# Patient Record
Sex: MALE | Race: WHITE | Employment: OTHER | URBAN - METROPOLITAN AREA
[De-identification: names, ages, dates, MRNs, and addresses within clinical notes are randomized per-mention and may not be internally consistent; named-entity substitution may affect disease eponyms.]

---

## 2017-08-22 PROBLEM — J30.9 ALLERGIC RHINITIS: Status: ACTIVE | Noted: 2017-08-22

## 2017-11-29 PROBLEM — J32.9 RECURRENT SINUSITIS: Status: ACTIVE | Noted: 2017-11-29

## 2018-06-19 PROBLEM — E66.3 OVERWEIGHT (BMI 25.0-29.9): Status: ACTIVE | Noted: 2018-06-19

## 2018-12-06 PROBLEM — J32.9 RECURRENT SINUSITIS: Status: RESOLVED | Noted: 2017-11-29 | Resolved: 2018-12-06

## 2020-12-22 ENCOUNTER — HOSPITAL ENCOUNTER (OUTPATIENT)
Dept: GENERAL RADIOLOGY | Age: 75
Discharge: HOME | End: 2020-12-22
Attending: FAMILY MEDICINE
Payer: MEDICARE

## 2020-12-22 DIAGNOSIS — R05.9 COUGH: ICD-10-CM

## 2020-12-22 PROCEDURE — 71046 X-RAY EXAM CHEST 2 VIEWS: CPT

## 2021-01-22 ENCOUNTER — HOSPITAL ENCOUNTER (OUTPATIENT)
Dept: GENERAL RADIOLOGY | Age: 76
Discharge: HOME | End: 2021-01-22
Payer: MEDICARE

## 2021-01-22 DIAGNOSIS — J18.9 PNEUMONIA OF RIGHT LOWER LOBE DUE TO INFECTIOUS ORGANISM: ICD-10-CM

## 2021-01-22 PROCEDURE — 71046 X-RAY EXAM CHEST 2 VIEWS: CPT

## 2021-01-24 NOTE — PROGRESS NOTES
Please let know improved. Still with some scarring and atelectasis. Would like to go ahead and proceed with CT chest w/o contrast to further evaluate.

## 2021-02-01 ENCOUNTER — HOSPITAL ENCOUNTER (OUTPATIENT)
Dept: CT IMAGING | Age: 76
Discharge: HOME | End: 2021-02-01
Attending: FAMILY MEDICINE
Payer: MEDICARE

## 2021-02-01 DIAGNOSIS — J98.4 SCARRING OF LUNG: ICD-10-CM

## 2021-02-01 DIAGNOSIS — J18.9 PNEUMONIA OF RIGHT LOWER LOBE DUE TO INFECTIOUS ORGANISM: ICD-10-CM

## 2021-02-01 PROCEDURE — 71250 CT THORAX DX C-: CPT

## 2021-05-06 ENCOUNTER — HOSPITAL ENCOUNTER (OUTPATIENT)
Dept: CT IMAGING | Age: 76
Discharge: HOME | End: 2021-05-06
Attending: PHYSICIAN ASSISTANT
Payer: MEDICARE

## 2021-05-06 DIAGNOSIS — U07.1 COVID-19: ICD-10-CM

## 2021-05-06 DIAGNOSIS — J12.82 PNEUMONIA DUE TO COVID-19 VIRUS: ICD-10-CM

## 2021-05-06 DIAGNOSIS — U07.1 PNEUMONIA DUE TO COVID-19 VIRUS: ICD-10-CM

## 2021-05-06 PROCEDURE — 71250 CT THORAX DX C-: CPT

## 2021-06-25 PROBLEM — Z86.16 HISTORY OF COVID-19: Status: ACTIVE | Noted: 2021-06-25

## 2021-06-25 PROBLEM — N40.0 BENIGN PROSTATIC HYPERPLASIA WITHOUT LOWER URINARY TRACT SYMPTOMS: Status: ACTIVE | Noted: 2021-06-25

## 2021-08-10 ENCOUNTER — HOSPITAL ENCOUNTER (OUTPATIENT)
Dept: WOUND CARE | Age: 76
Discharge: HOME | End: 2021-08-10
Attending: SURGERY
Payer: MEDICARE

## 2021-08-10 VITALS
TEMPERATURE: 96.3 F | RESPIRATION RATE: 20 BRPM | WEIGHT: 215 LBS | SYSTOLIC BLOOD PRESSURE: 137 MMHG | HEIGHT: 72 IN | HEART RATE: 84 BPM | OXYGEN SATURATION: 98 % | DIASTOLIC BLOOD PRESSURE: 84 MMHG | BODY MASS INDEX: 29.12 KG/M2

## 2021-08-10 DIAGNOSIS — S81.802A LEG WOUND, LEFT, INITIAL ENCOUNTER: Primary | ICD-10-CM

## 2021-08-10 PROCEDURE — 99204 OFFICE O/P NEW MOD 45 MIN: CPT | Performed by: SURGERY

## 2021-08-10 PROCEDURE — 99213 OFFICE O/P EST LOW 20 MIN: CPT

## 2021-08-10 RX ORDER — LIDOCAINE 40 MG/G
CREAM TOPICAL ONCE
Status: CANCELLED | OUTPATIENT
Start: 2021-08-10 | End: 2021-08-10

## 2021-08-10 RX ORDER — LIDOCAINE HYDROCHLORIDE 40 MG/ML
SOLUTION TOPICAL ONCE
Status: CANCELLED | OUTPATIENT
Start: 2021-08-10 | End: 2021-08-10

## 2021-08-10 RX ORDER — BETAMETHASONE DIPROPIONATE 0.5 MG/G
OINTMENT TOPICAL ONCE
Status: CANCELLED | OUTPATIENT
Start: 2021-08-10 | End: 2021-08-10

## 2021-08-10 RX ORDER — BACITRACIN 500 [USP'U]/G
OINTMENT TOPICAL ONCE
Status: CANCELLED | OUTPATIENT
Start: 2021-08-10 | End: 2021-08-10

## 2021-08-10 RX ORDER — GENTAMICIN SULFATE 1 MG/G
OINTMENT TOPICAL ONCE
Status: CANCELLED | OUTPATIENT
Start: 2021-08-10 | End: 2021-08-10

## 2021-08-10 RX ORDER — LIDOCAINE HYDROCHLORIDE 20 MG/ML
JELLY TOPICAL ONCE
Status: CANCELLED | OUTPATIENT
Start: 2021-08-10 | End: 2021-08-10

## 2021-08-10 RX ORDER — SILVER SULFADIAZINE 10 G/1000G
CREAM TOPICAL ONCE
Status: CANCELLED | OUTPATIENT
Start: 2021-08-10 | End: 2021-08-10

## 2021-08-10 RX ORDER — BACITRACIN ZINC AND POLYMYXIN B SULFATE 500; 1000 [USP'U]/G; [USP'U]/G
OINTMENT TOPICAL ONCE
Status: CANCELLED | OUTPATIENT
Start: 2021-08-10 | End: 2021-08-10

## 2021-08-10 RX ORDER — MUPIROCIN 20 MG/G
OINTMENT TOPICAL ONCE
Status: CANCELLED | OUTPATIENT
Start: 2021-08-10 | End: 2021-08-10

## 2021-08-10 RX ORDER — LIDOCAINE 50 MG/G
OINTMENT TOPICAL ONCE
Status: CANCELLED | OUTPATIENT
Start: 2021-08-10 | End: 2021-08-10

## 2021-08-10 RX ORDER — CLOBETASOL PROPIONATE 0.5 MG/G
OINTMENT TOPICAL ONCE
Status: CANCELLED | OUTPATIENT
Start: 2021-08-10 | End: 2021-08-10

## 2021-08-10 RX ORDER — TRIAMCINOLONE ACETONIDE 1 MG/G
OINTMENT TOPICAL ONCE
Status: CANCELLED | OUTPATIENT
Start: 2021-08-10 | End: 2021-08-10

## 2021-08-10 NOTE — WOUND CARE
[address]  [address], [address]  Phone: [phone]  Fax: [phone]    Patient: Elian Boyd MRN: [id number]  SSN: [id number]    YOB: 1945  Age: 70 y.o. Sex: male       Return Appointment: 2 weeks with Dr Prosper Dykes    Instructions: Left Lower Leg:  Cleanse with Normal Saline  Hydrofera Ready: Cut to wound size, place in wound bed, shiny side out. Cover with Gauze/ABD  Secure with Coversite/Rolled Gauze  Change 3x/week  Tubigrip to Left Lower Leg    DO NOT GET WOUND WET-PURCHASE CAST COVER AT [facility] OR [facility]    Should you experience increased redness, swelling, pain, foul odor, size of wound(s), or have a temperature over 101 degrees please contact the [address] [place] [address] Road at [phone] or if after hours contact your primary care physician or go to the hospital emergency department.     Signed By: Sonal Garzon RN     August 10, 2021

## 2021-08-10 NOTE — DISCHARGE INSTRUCTIONS
[address]  [address], [address]  Phone: [phone]  Fax: [phone]    Patient: Leatha Lanes MRN: [id number]  SSN: [id number]    YOB: 1945  Age: 70 y.o. Sex: male       Return Appointment: 2 weeks with Dr Lamar Gonzalez    Instructions: Left Lower Leg:  Cleanse with Normal Saline  Hydrofera Ready: Cut to wound size, place in wound bed, shiny side out. Cover with Gauze/ABD  Secure with Coversite/Rolled Gauze  Change 3x/week  Tubigrip to Left Lower Leg    DO NOT GET WOUND WET-PURCHASE CAST COVER AT [facility] OR [facility]    Should you experience increased redness, swelling, pain, foul odor, size of wound(s), or have a temperature over 101 degrees please contact the [address] [place] [address] Road at [phone] or if after hours contact your primary care physician or go to the hospital emergency department.     Signed By: Hansel Hammonds RN     August 10, 2021

## 2021-08-10 NOTE — WOUND CARE
08/10/21 1337   Wound Leg lower Left 08/10/21   Date First Assessed: 08/10/21   Wound Approximate Age at First Assessment (Weeks): 5 weeks  Primary Wound Type: Traumatic  Location: Leg lower  Wound Location Orientation: Left  Date of First Observation: 08/10/21   Wound Image    Wound Etiology Traumatic   Cleansed Cleansed with saline   Dressing/Treatment Open to air   Wound Length (cm) 3.5 cm   Wound Width (cm) 0.8 cm   Wound Depth (cm) 0.1 cm   Wound Surface Area (cm^2) 2.8 cm^2   Wound Volume (cm^3) 0.28 cm^3   Wound Assessment Granulation tissue;Slough   Drainage Amount Moderate   Drainage Description Serosanguinous   Wound Odor None   Melba-Wound/Incision Assessment Intact   Wound Thickness Description Full thickness

## 2021-08-10 NOTE — PROGRESS NOTES
[address]   Progress Note and Procedure Note   NO Procedure      Ld Wick III  MEDICAL RECORD NUMBER:  [id number]  AGE: 70 y.o. RACE WHITE/NON-  GENDER: male  : 1945  EPISODE DATE:  8/10/2021    Subjective:     Chief Complaint   Patient presents with    Wound Check     LLE         HISTORY of PRESENT ILLNESS HPI    Ld Wick III is a 70 y.o. male  with a history of COPD, SHYAM,prior tobacco abuse, prior R lobectomy,  chronic sinusitis/polyps, allergic rhinitis, HTN, and GERD. Pt was diagnosed with COVID 2020. Pt was treated with abx and steroids for COVID PNA on 2020. Patient has been referred to wound clinic d/t non healing wound on LLE. He obtained a laceration to his leg doing yard work about 4 weeks prior. He has been using bactroban on the wound. He has a history of bilateral knee replacements with the left knee having to be replace due to infection and he is worried this may cause an infection to his hardware. He has been seen by his PCP and has had two cultures, the last returning with pan sensitive Citrobacter Koseri. He is currently taking doxyclcline and this seems to have made a big difference in healing per patient. He does not have history of PVD or Venous insufficiency. He is retired. He worked as an . He denies fever, chills, or sweats. History of Wound Context: LLE traumatic wound 4 weeks prior. Wound/Ulcer Pain Timing/Severity: mild  Quality of pain: sharp  Severity:  5 / 10   Modifying Factors: Pain worsens with walking  Associated Signs/Symptoms: edema, erythema, decreased drainage and pain    Ulcer Identification:  Ulcer Type: traumatic    Contributing Factors: none    Wound: LLE traumatic wound, scaling around wound, minor erythema and edema.           PAST MEDICAL HISTORY    Past Medical History:   Diagnosis Date    Asthma     Bronchitis     COPD [facility]chronic obstructive pulmonary disease) ([facility] [address]     Depression     Elevated PSA 2016    GERD (gastroesophageal reflux disease)     Hypertension     Malignant melanoma of skin (HCC)     SHYAM (obstructive sleep apnea)     Personal history of colonic polyps     Prostatitis     Sinusitis, chronic         PAST SURGICAL HISTORY    Past Surgical History:   Procedure Laterality Date    HX CATARACT REMOVAL Bilateral ,2010    HX HEENT      sinus surgery    HX KNEE REPLACEMENT      right 2006 left 2013    HX KNEE REPLACEMENT Left     HX LOBECTOMY      right lung    HX MALIGNANT SKIN LESION EXCISION      rt shoulder, forehead     HX RHINOPLASTY  2009    HX RHINOPLASTY  2018       FAMILY HISTORY    Family History   Problem Relation Age of Onset    Colon Cancer Father     Prostate Cancer Father     Hypertension Mother        SOCIAL HISTORY    Social History     Tobacco Use    Smoking status: Former Smoker     Packs/day: 1.00     Years: 7.00     Pack years: 7.00     Types: Cigarettes     Quit date: 3/24/1975     Years since quittin.4    Smokeless tobacco: Never Used   Vaping Use    Vaping Use: Never used   Substance Use Topics    Alcohol use: Yes     Comment: rarely    Drug use: No       ALLERGIES    Allergies   Allergen Reactions    Avelox [Moxifloxacin] Hives    Celebrex [Celecoxib] Hives    Penicillins Unknown (comments)     Upset stomach       MEDICATIONS    Current Outpatient Medications on File Prior to Encounter   Medication Sig Dispense Refill    doxycycline (MONODOX) 100 mg capsule Take 1 Capsule by mouth two (2) times a day for 14 days. 28 Capsule 0    benralizumab (Fasenra) 30 mg/mL syrg 30 mg by SubCUTAneous route every two (2) months.  tamsulosin (FLOMAX) 0.4 mg capsule Take 1 Cap by mouth daily. 90 Cap 3    irbesartan (AVAPRO) 300 mg tablet Take 1 Tab by mouth nightly. 90 Tab 3    amLODIPine (NORVASC) 10 mg tablet Take 1 Tab by mouth daily.  90 Tab 3    budesonide-formoterol (SYMBICORT) 160-4.5 mcg/actuation HFAA Take 2 Puffs by inhalation two (2) times a day.  fluticasone (FLONASE) 50 mcg/actuation nasal spray 2 puffs each nostril at bedtime 3 Bottle 3    montelukast (SINGULAIR) 10 mg tablet Take 1 Tab by mouth daily. 90 Tab 3    multivitamin (ONE A DAY) tablet Take 1 Tab by mouth daily.  albuterol (PROAIR HFA) 90 mcg/actuation inhaler Take  by inhalation.  cholecalciferol, vitamin D3, (VITAMIN D3) 2,000 unit tab Take  by mouth. No current facility-administered medications on file prior to encounter. REVIEW OF SYSTEMS    A comprehensive review of systems was negative except for that written in the HPI. Objective:     Visit Vitals  /84 (BP 1 Location: Left upper arm, BP Patient Position: At rest)   Pulse 84   Temp (!) 96.3 °F (35.7 °C)   Resp 20   Ht 6' (1.829 m)   Wt 215 lb (97.5 kg)   SpO2 98%   BMI 29.16 kg/m²       Wt Readings from Last 3 Encounters:   08/10/21 215 lb (97.5 kg)   08/03/21 212 lb (96.2 kg)   07/21/21 215 lb (97.5 kg)       PHYSICAL EXAM    General: well developed, well nourished, pleasant, NAD. Hygiene good  Psych: cooperative. Pleasant. No anxiety or depression. Normal mood and affect. HEENT: Normocephalic, atraumatic. EOMI. Conjunctiva clear. No scleral icterus. Neck: Normal range of motion. No masses. Chest: Good air entry bilaterally. Respirations nonlabored  Cardio: Normal heart sounds,no rubs, murmurs or gallops  Abdomen: Soft, nontender, nondistended, normoactive bowel sounds  Vascular: Femoral pulse present , DP and PT pulses are present at 2/4 bilateral. Skin temperature is uniform from proximal to distal bilateral. Hair growth is present bilateral. No erythema, edema, Tempeture warm , No evidence of cellulitis. Derm:  Skin is atrophic and xerotic. Mild scaling around LLE wound  Neuro: Epicritic sensation is intact bilateral.   Msk: Muscle strength is 5/5 for plantar and dorsiflexion of foot bilateral. ROM of all joints is full and fluid without pain. No effusions are palpable. Assessment:      Problem List Items Addressed This Visit        Other    Leg wound, left, initial encounter - Primary    Relevant Orders    INITIATE OUTPATIENT WOUND CARE PROTOCOL          Procedure Note  Indications:  Based on my examination of this patient's wound(s)/ulcer(s) today, debridement is not required to promote healing and evaluate the wound base. Wound Leg lower Left 08/10/21 (Active)   Wound Image   08/10/21 1337   Wound Etiology Traumatic 08/10/21 1337   Cleansed Cleansed with saline 08/10/21 1337   Dressing/Treatment Open to air 08/10/21 1337   Wound Length (cm) 3.5 cm 08/10/21 1337   Wound Width (cm) 0.8 cm 08/10/21 1337   Wound Depth (cm) 0.1 cm 08/10/21 1337   Wound Surface Area (cm^2) 2.8 cm^2 08/10/21 1337   Wound Volume (cm^3) 0.28 cm^3 08/10/21 1337   Wound Assessment Granulation tissue;Slough 08/10/21 1337   Drainage Amount Moderate 08/10/21 1337   Drainage Description Serosanguinous 08/10/21 1337   Wound Odor None 08/10/21 1337   Melba-Wound/Incision Assessment Intact 08/10/21 1337   Wound Thickness Description Full thickness 08/10/21 1337   Number of days: 0        Amount and/or Complexity of Data Reviewed and Analyzed:  I reviewed and analyzed all of the unique labs and radiologic studies that are shown below as well as any that are in the HPI, and any that are in the expanded problem list below  *Each unique test, order, or document contributes to the combination of 2 or combination of 3 in Category 1 below. I also independently reviewed radiology images for studies I described above in the HPI or studies I have ordered. For this visit I also reviewed old records and prior notes. No results for input(s): WBC, HGB, PLT, NA, K, CL, CO2, BUN, CREA, GLU, PTP, INR, APTT, TBIL, TBILI, CBIL, ALT, AP, AML, AML, LPSE, LCAD, NH4, TROPT, TROIQ, PCO2, PO2, HCO3, HGBEXT, PLTEXT, INREXT, HGBEXT, PLTEXT, INREXT in the last 72 hours.     No lab exists for component: SGOT, GPT,  PH  No results for input(s): PTP, INR, APTT, TBIL, TBILI, CBIL, ALT, AP, AML, LPSE, INREXT, INREXT in the last 72 hours. No lab exists for component: SGOT, GPT    Most recent blood counts (CBC) review  Lab Results   Component Value Date/Time    WBC 6.3 12/23/2020 11:35 AM    HGB 15.8 12/23/2020 11:35 AM    HCT 46.9 12/23/2020 11:35 AM    PLATELET 093 90/95/0253 11:35 AM    MCV 91 12/23/2020 11:35 AM     Most recent chemistry (BMP) test review   Lab Results   Component Value Date/Time    Sodium 144 12/23/2020 11:35 AM    Potassium 4.4 12/23/2020 11:35 AM    Chloride 103 12/23/2020 11:35 AM    CO2 26 12/23/2020 11:35 AM    Glucose 96 12/23/2020 11:35 AM    BUN 14 12/23/2020 11:35 AM    Creatinine 0.86 12/23/2020 11:35 AM    BUN/Creatinine ratio 16 12/23/2020 11:35 AM    GFR est AA 99 12/23/2020 11:35 AM    GFR est non-AA 86 12/23/2020 11:35 AM    Calcium 9.2 12/23/2020 11:35 AM     Most recent Liver enzyme test review  Lab Results   Component Value Date/Time    ALT (SGPT) 17 12/23/2020 11:35 AM    AST (SGOT) 14 12/23/2020 11:35 AM    Alk.  phosphatase 110 12/23/2020 11:35 AM    Bilirubin, total 0.8 12/23/2020 11:35 AM     Most recent coagulation (coags) review  No results found for: INR, PTMR, PTP, PT1, PT2, INREXT, INREXT  No results found for: INR, APTT, CBIL, AML, AML, LPSE, LCAD, NH4, TROPT, TROIQ, INREXT, INREXT    Diabetic assessment  No results found for: HBA1C, PVA2WTHU, CNM5GAFS, KHZ3DIEH    Nutritional assessment screen to assess wound healing ability:  Lab Results   Component Value Date/Time    Protein, total 6.7 12/23/2020 11:35 AM    Albumin 4.3 12/23/2020 11:35 AM     Lab Results   Component Value Date/Time    Protein, total 6.7 12/23/2020 11:35 AM    Albumin 4.3 12/23/2020 11:35 AM       XR Results (most recent):  Results from Hospital Encounter encounter on 01/22/21    XR CHEST PA LAT    Narrative  TWO-VIEW CHEST:    CLINICAL HISTORY: Follow-up right lower lobe pneumonia. COMPARISON:  December 20, 2020. FINDINGS: PA and lateral chest images demonstrate interval improvement in right  right basilar atelectasis/infiltrate with mild residual bibasilar  atelectasis/scarring. Elevation of the right hemidiaphragm is again noted in  association with interposition of the hepatic flexure between the liver and  hemidiaphragm. The heart size is within normal limits without evidence of  congestive heart failure or pneumothorax. The bony thorax appears intact on  these views. Impression  INTERVAL IMPROVEMENT IN RIGHT BASILAR ATELECTASIS/INFILTRATE WITH  MILD RESIDUAL BIBASILAR ATELECTASIS/SCAR ADJACENT TO THE MILDLY ELEVATED  HEMIDIAPHRAGM. CT Results (most recent):  Results from Hospital Encounter encounter on 05/06/21    CT CHEST WO CONT    Narrative  EXAMINATION: CT CHEST WITHOUT INTRAVENOUS CONTRAST 5/6/2021 9:15 AM    ACCESSION NUMBER: [id number]    INDICATION: Pneumonia due to COVID 19    COMPARISON: Chest CT 2/1/2021    TECHNIQUE: Multiple contiguous axial CT images of the chest were obtained from  the lung apices to the lung bases after without intravenous contrast.    Radiation dose reduction techniques were used for this study:  Our CT scanners  use one or all of the following: Automated exposure control, adjustment of the  mA and/or kVp according to patient's size, iterative reconstruction. FINDINGS:    THORACIC AORTA: Mild scattered atherosclerosis. PROXIMAL GREAT VESSELS: Unremarkable    HEART: Left anterior descending coronary artery atherosclerosis. PERICARDIUM: Unremarkable    MEDIASTINAL LYMPH NODES: Unremarkable  HILAR LYMPH NODES: Unremarkable  AXILLARY LYMPH NODES: Unremarkable    PULMONARY PARENCHYMA: Minimal linear scarring in the lingula, unchanged from the  prior. 0.2 cm left lower lobe subpleural nodule (axial image 27). Voice calcifications along the base of the right middle lobe unchanged.  Linear  opacities and reticulonodular densities in the periphery of the right upper lobe  and right middle lobe persists but are somewhat improved in comparison to the  prior. No new suspicious nodules or masses in the right lung. The large central airways are predominantly clear. PLEURA: No pneumothorax. No pleural effusion. VISUALIZED UPPER ABDOMEN: There is no free intraperitoneal gas in the included  portions of the upper abdomen. Cholelithiasis. OSSEOUS STRUCTURES: Thoracic spine spondylosis without suspicious lytic or  blastic bony lesions. Impression  1. Some improvement of ground glass opacities and reticular nodular densities in  the periphery of the right upper lobe and right middle lobe. The remaining areas  of abnormal pulmonary parenchyma may represent subacute to chronic sequelae of  the prior COVID 19 infection. To what extent these may improve in the future is  uncertain. 2. Unchanged linear scarring in the lingula. 3. New 0.2 cm left lower lobe subpleural nodule, favored to be inflammatory. VOICE DICTATED BY: Dr. Aung Carbajal        Admission date (for inpatients): 8/10/2021   * No surgery found *  * No surgery found *    Plan:     70 y.o. male  with a history of COPD, SHYAM,prior tobacco abuse, prior R lobectomy,  chronic sinusitis/polyps, allergic rhinitis, HTN, and GERD.      Traumatic Laceration of left lower extremity, initial encounter  Patient with culture of wound with Dontrell Norman; antibiotic coverage appropriate  Patient will finish course of antibiotics  No need for further cultures  Will apply hydrofera ready to wound bed, instructed patient with wound care  Will apply minimal compression with tubigrip  Patient to return to clinic in 2 weeks for assessment  Patient with great pulses in feet, no swelling in LLE or RLE    Follow-up Information     Follow up With Specialties Details Why Contact Info    [facility] Wound Care In 2 weeks For wound re-check Lionel Altamirano [facility] [place] [address] [address] [place] [address]  [phone]                Active Orders   Nursing    INITIATE OUTPATIENT WOUND CARE PROTOCOL     Frequency: ONE TIME     Number of Occurrences: 1 Occurrences     Order Comments: Cleanse wound with saline. If wound contains bioburden or contamination, cleanse with wound cleanser or antimicrobial solution. For normal periwound tissue without irritation nor maceration, apply topical skin protectant. For periwound tissue with irritation and/or maceration, apply zinc based product, topical steroid cream/ointment, or equivalent. For wounds with dry firm black eschar and/or without exudate, apply betadine and leave open to air. For wounds with scant/small to no exudate or drainage, apply wound gel, hydrocolloid, polymer, or equivalent and cover with secondary dressing/foam.    For wounds with moderate/large exudate or drainage, apply alginate, hydrofiber, polymer, or equivalent and cover with secondary dressing/foam.    For wounds with nonviable tissue requiring removal, apply chemical or mechanical debrider and cover with secondary dressing/foam.    For wounds with tunneling, dead space, or cavity, fill or pack with strip/guaze/kerlix to fit and co... WOUND CARE, DRESSING CHANGE     Frequency: ONE TIME     Number of Occurrences: 1 Occurrences     Order Comments: Left Lower Leg:  Cleanse with Normal Saline  Hydrofera Ready: Cut to wound size, place in wound bed, shiny side out. Cover with Gauze/ABD  Secure with Coversite/Rolled Gauze  Change 3x/week  Tubigrip to Left Lower Leg    DO NOT GET WOUND WET-PURCHASE CAST COVER AT [facility] OR [facility]         Treatment Note please see attached Discharge Instructions    Written patient dismissal instructions given to patient or POA. Orders Placed This Encounter    WOUND CARE, DRESSING CHANGE     Left Lower Leg:  Cleanse with Normal Saline  Hydrofera Ready: Cut to wound size, place in wound bed, shiny side out.   Cover with Gauze/ABD  Secure with Coversite/Rolled Gauze  Change 3x/week  Tubigrip to Left Lower Leg    DO NOT GET WOUND WET-PURCHASE CAST COVER AT [facility] OR [facility]     Standing Status:   Standing     Number of Occurrences:   1    INITIATE OUTPATIENT WOUND CARE PROTOCOL     Cleanse wound with saline. If wound contains bioburden or contamination, cleanse with wound cleanser or antimicrobial solution. For normal periwound tissue without irritation nor maceration, apply topical skin protectant. For periwound tissue with irritation and/or maceration, apply zinc based product, topical steroid cream/ointment, or equivalent. For wounds with dry firm black eschar and/or without exudate, apply betadine and leave open to air. For wounds with scant/small to no exudate or drainage, apply wound gel, hydrocolloid, polymer, or equivalent and cover with secondary dressing/foam.    For wounds with moderate/large exudate or drainage, apply alginate, hydrofiber, polymer, or equivalent and cover with secondary dressing/foam.    For wounds with nonviable tissue requiring removal, apply chemical or mechanical debrider and cover with secondary dressing/foam.    For wounds with tunneling, dead space, or cavity, fill or pack with strip/guaze/kerlix to fit and cover with secondary dressing/foam.    For wounds with adequate granulation or epithelization, apply wound gel, hydrocolloid, polymer, collagen, or transparent film, and cover secondary dry dressing/foam.    For wounds that need additional secondary dressing to help pad or control additional drainage/exudates, add foam, absorbent pad or hydrocolloid. For wounds with suspected or know infection, apply antimicrobial mesh and/or antimicrobial alginate/hydrofiber, or antimicrobial solution moistened gauze/kerlix, or equivalent and cover with secondary dressing foam.    Compression management needed for edema control, apply multilayer compression or tubular garment or equivalent.     Offloading Management needed for pressure relief, apply offloading shoe/boot or equivalent. Standing Status:   Standing     Number of Occurrences:   1       No orders of the defined types were placed in this encounter. Level of MDM (Based on 2/3 elements below)  Number and Complexity of Problems Addressed Amount and/or Complexity of Data to be Reviewed and Analyzed  *Each unique test, order, or document contributes to the combination of 2 or combination of 3 in Category 1 below.  Risk of Complications and/or Morbidity or Mortality of pt Management     20592  85703 SF Minimal  1self-limited or minor problem Minimal or none Minimal risk of morbidity from additional diagnostic testing or Rx   64474  29415 Low Low  2or more self-limited or minor problems;    or  1stable chronic illness;    or  4QCWOS, uncomplicated illness or injury   Limited  (Must meet the requirements of at least 1 of the 2 categories)  Category 1: Tests and documents   Any combination of 2 from the following:  Review of prior external note(s) from each unique source*;  review of the result(s) of each unique test*;   ordering of each unique test*    or   Category 2: Assessment requiring an independent historian(s)  (For the categories of independent interpretation of tests and discussion of management or test interpretation, see moderate or high) Low risk of morbidity from additional diagnostic testing or treatment     85510  27026 Mod Moderate  1or more chronic illnesses with exacerbation, progression, or side effects of treatment;    or  2or more stable chronic illnesses;    or      1undiagnosed new problem with uncertain prognosis;    or  1acute illness with systemic symptoms;    or  7ONKWE complicated injury   Moderate:  (Must meet the requirements of 1 out of 3 categories)    Category 1: Tests, documents, or independent historian(s)  Any combination of 3 from the following:   Review of prior external note(s) from each unique source*;  Review of the result(s) of each unique test*;  Ordering of each unique test*;  Assessment requiring an independent historian(s)    or  Category 2: Independent interpretation of tests   Independent interpretation of a test performed by another physician/other qualified health care professional (not separately reported);     or  Category 3: Discussion of management or test interpretation  Discussion of management or test interpretation with external physician/other qualified health care professional/appropriate source (not separately reported)   Moderate risk of morbidity from additional diagnostic testing or treatment  Examples only:  Prescription drug management   Decision regarding minor surgery with identified patient or procedure risk factors  Decision regarding elective major surgery without identified patient or procedure risk factors   Diagnosis or treatment significantly limited by social determinants of health       53105  16036 High High  1or more chronic illnesses with severe exacerbation, progression, or side effects of treatment;    or  1 acute or chronic illness or injury that poses a threat to life or bodily function   Extensive  (Must meet the requirements of at least 2 out of 3 categories)    Category 1: Tests, documents, or independent historian(s)  Any combination of 3 from the following:   Review of prior external note(s) from each unique source*;  Review of the result(s) of each unique test*;   Ordering of each unique test*;   Assessment requiring an independent historian(s)    or   Category 2: Independent interpretation of tests   Independent interpretation of a test performed by another physician/other qualified health care professional (not separately reported);     or  Category 3: Discussion of management or test interpretation  Discussion of management or test interpretation with external physician/other qualified health care professional/appropriate source (not separately reported)   High risk of morbidity from additional diagnostic testing or treatment  Examples only:  Drug therapy requiring intensive monitoring for toxicity  Decision regarding elective major surgery with identified patient or procedure risk factors  Decision regarding emergency major surgery  Decision regarding hospitalization  Decision not to resuscitate or to de-escalate care because of poor prognosis             I have personally performed a face-to-face diagnostic evaluation and management  service on this patient. I have independently seen the patient. I have independently obtained the above history from the patient/family. I have independently examined the patient with above findings. I have independently reviewed data/labs for this patient and developed the above plan of care (MDM).   Electronically signed by Lucho Goodrich DO on 8/10/2021 at 4:03 PM

## 2021-08-24 ENCOUNTER — HOSPITAL ENCOUNTER (OUTPATIENT)
Dept: WOUND CARE | Age: 76
Discharge: HOME | End: 2021-08-24
Attending: SURGERY
Payer: MEDICARE

## 2021-08-24 VITALS
HEART RATE: 92 BPM | SYSTOLIC BLOOD PRESSURE: 126 MMHG | BODY MASS INDEX: 28.99 KG/M2 | TEMPERATURE: 98.3 F | WEIGHT: 214 LBS | OXYGEN SATURATION: 95 % | HEIGHT: 72 IN | RESPIRATION RATE: 18 BRPM | DIASTOLIC BLOOD PRESSURE: 79 MMHG

## 2021-08-24 DIAGNOSIS — S81.802A LEG WOUND, LEFT, INITIAL ENCOUNTER: Primary | ICD-10-CM

## 2021-08-24 PROCEDURE — 99212 OFFICE O/P EST SF 10 MIN: CPT

## 2021-08-24 PROCEDURE — 99213 OFFICE O/P EST LOW 20 MIN: CPT | Performed by: SURGERY

## 2021-08-24 RX ORDER — CLOBETASOL PROPIONATE 0.5 MG/G
OINTMENT TOPICAL ONCE
Status: CANCELLED | OUTPATIENT
Start: 2021-08-24 | End: 2021-08-24

## 2021-08-24 RX ORDER — LIDOCAINE HYDROCHLORIDE 40 MG/ML
SOLUTION TOPICAL ONCE
Status: CANCELLED | OUTPATIENT
Start: 2021-08-24 | End: 2021-08-24

## 2021-08-24 RX ORDER — SILVER SULFADIAZINE 10 G/1000G
CREAM TOPICAL ONCE
Status: CANCELLED | OUTPATIENT
Start: 2021-08-24 | End: 2021-08-24

## 2021-08-24 RX ORDER — LIDOCAINE HYDROCHLORIDE 20 MG/ML
JELLY TOPICAL ONCE
Status: CANCELLED | OUTPATIENT
Start: 2021-08-24 | End: 2021-08-24

## 2021-08-24 RX ORDER — GENTAMICIN SULFATE 1 MG/G
OINTMENT TOPICAL ONCE
Status: CANCELLED | OUTPATIENT
Start: 2021-08-24 | End: 2021-08-24

## 2021-08-24 RX ORDER — BETAMETHASONE DIPROPIONATE 0.5 MG/G
OINTMENT TOPICAL ONCE
Status: CANCELLED | OUTPATIENT
Start: 2021-08-24 | End: 2021-08-24

## 2021-08-24 RX ORDER — MUPIROCIN 20 MG/G
OINTMENT TOPICAL ONCE
Status: CANCELLED | OUTPATIENT
Start: 2021-08-24 | End: 2021-08-24

## 2021-08-24 RX ORDER — BACITRACIN ZINC AND POLYMYXIN B SULFATE 500; 1000 [USP'U]/G; [USP'U]/G
OINTMENT TOPICAL ONCE
Status: CANCELLED | OUTPATIENT
Start: 2021-08-24 | End: 2021-08-24

## 2021-08-24 RX ORDER — LIDOCAINE 50 MG/G
OINTMENT TOPICAL ONCE
Status: CANCELLED | OUTPATIENT
Start: 2021-08-24 | End: 2021-08-24

## 2021-08-24 RX ORDER — BACITRACIN 500 [USP'U]/G
OINTMENT TOPICAL ONCE
Status: CANCELLED | OUTPATIENT
Start: 2021-08-24 | End: 2021-08-24

## 2021-08-24 RX ORDER — LIDOCAINE 40 MG/G
CREAM TOPICAL ONCE
Status: CANCELLED | OUTPATIENT
Start: 2021-08-24 | End: 2021-08-24

## 2021-08-24 RX ORDER — TRIAMCINOLONE ACETONIDE 1 MG/G
OINTMENT TOPICAL ONCE
Status: CANCELLED | OUTPATIENT
Start: 2021-08-24 | End: 2021-08-24

## 2021-08-24 NOTE — WOUND CARE
[address]  [address], [address]  [place]   Phone: [phone]  Fax: [phone]    Patient: Remedios Mario MRN: [id number]  SSN: [id number]    YOB: 1945  Age: 70 y.o. Sex: male       Return Appointment: 2 weeks with Dr. Lou Ross    Instructions:  Left Lower Leg:  Cleanse with Normal Saline  Hydrofera Ready: Cut to wound size, place in wound bed, shiny side out. Cover with Gauze/ABD  Secure with Coversite/Rolled Gauze  Change 3x/week  Tubigrip to Left Lower Leg    Should you experience increased redness, swelling, pain, foul odor, size of wound(s), or have a temperature over 101 degrees please contact the [address] [place] [address] Road at [phone] or if after hours contact your primary care physician or go to the hospital emergency department.     Signed By: Joanna Reid RN     August 24, 2021

## 2021-08-24 NOTE — WOUND CARE
08/24/21 1432   Wound Leg lower Left 08/10/21   Date First Assessed: 08/10/21   Wound Approximate Age at First Assessment (Weeks): 5 weeks  Primary Wound Type: Traumatic  Location: Leg lower  Wound Location Orientation: Left  Date of First Observation: 08/10/21   Wound Image    Wound Etiology Traumatic   Dressing Status Intact   Cleansed Cleansed with saline   Dressing/Treatment Hydrofera Blue;Band-Aid/Adhesive bandage   Wound Length (cm) 1.8 cm   Wound Width (cm) 0.5 cm   Wound Depth (cm) 0.1 cm   Wound Surface Area (cm^2) 0.9 cm^2   Change in Wound Size % 67.86   Wound Volume (cm^3) 0.09 cm^3   Wound Healing % 68   Wound Assessment Epithelialization;Granulation tissue   Drainage Amount Small   Drainage Description Serosanguinous   Wound Odor None   Melba-Wound/Incision Assessment Intact   Wound Thickness Description Full thickness

## 2021-08-24 NOTE — DISCHARGE INSTRUCTIONS
[address]  [address], [address]   Phone: [phone]  Fax: [phone]    Patient: Gonzalo Escalante MRN: [id number]  SSN: [id number]    YOB: 1945  Age: 70 y.o. Sex: male       Return Appointment: 2 weeks with Dr. Frances Hays    Instructions:  Left Lower Leg:  Cleanse with Normal Saline  Hydrofera Ready: Cut to wound size, place in wound bed, shiny side out. Cover with Gauze/ABD  Secure with Coversite/Rolled Gauze  Change 3x/week  Tubigrip to Left Lower Leg    Should you experience increased redness, swelling, pain, foul odor, size of wound(s), or have a temperature over 101 degrees please contact the [address] [place] [address] Road at [phone] or if after hours contact your primary care physician or go to the hospital emergency department.     Signed By: Zuleika Angel RN     August 24, 2021

## 2021-08-24 NOTE — PROGRESS NOTES
[address]   Progress Note and Procedure Note   NO Procedure      Gene Steinberg III  MEDICAL RECORD NUMBER:  [id number]  AGE: 70 y.o. RACE WHITE/NON-  GENDER: male  : 1945  EPISODE DATE:  2021    Subjective:     Chief Complaint   Patient presents with    Wound Check     LLE 21         HISTORY of PRESENT ILLNESS HPI    Gene Steinberg III is a 70 y.o. male  with a history of COPD, SHYAM,prior tobacco abuse, prior R lobectomy,  chronic sinusitis/polyps, allergic rhinitis, HTN, and GERD. Pt was diagnosed with COVID 2020. Pt was treated with abx and steroids for COVID PNA on 2020. Patient has been referred to wound clinic d/t non healing wound on LLE. He obtained a laceration to his leg doing yard work about 4 weeks prior. He has been using bactroban on the wound. He has a history of bilateral knee replacements with the left knee having to be replace due to infection and he is worried this may cause an infection to his hardware. He has been seen by his PCP and has had two cultures, the last returning with pan sensitive Citrobacter Koseri. He is currently taking doxyclcline and this seems to have made a big difference in healing per patient. He does not have history of PVD or Venous insufficiency. He is retired. He worked as an . He denies fever, chills, or sweats. Follow up visit: Patient returns for follow up visit 2021. His wound is healing well and is greatly decreased in size from previous. Mr. Elina Rodriguez is please with the progression of the wound. He has been placing hydrofera dressing 3x weekly. Minimal drainage from the wound. Wound with healing edges and improved granulation tissues. History of Wound Context: LLE traumatic wound 4 weeks prior.   Wound/Ulcer Pain Timing/Severity: mild  Quality of pain: sharp  Severity:  5 / 10   Modifying Factors: Pain worsens with walking  Associated Signs/Symptoms: edema, erythema, decreased drainage and pain    Ulcer Identification:  Ulcer Type: traumatic    Contributing Factors: none    Wound: LLE traumatic wound, scaling around wound, minor erythema and edema. PAST MEDICAL HISTORY    Past Medical History:   Diagnosis Date    Asthma     Bronchitis     COPD [facility]chronic obstructive pulmonary disease) ([facility] [address]     Depression     Elevated PSA 2016    GERD (gastroesophageal reflux disease)     Hypertension     Malignant melanoma of skin (HCC)     SHYAM (obstructive sleep apnea)     Personal history of colonic polyps     Prostatitis     Sinusitis, chronic         PAST SURGICAL HISTORY    Past Surgical History:   Procedure Laterality Date    HX CATARACT REMOVAL Bilateral ,    HX HEENT      sinus surgery    HX KNEE REPLACEMENT      right 2006 left 2013    HX KNEE REPLACEMENT Left     HX LOBECTOMY      right lung    HX MALIGNANT SKIN LESION EXCISION      rt shoulder, forehead     HX RHINOPLASTY  2009    HX RHINOPLASTY  2018       FAMILY HISTORY    Family History   Problem Relation Age of Onset    Colon Cancer Father     Prostate Cancer Father     Hypertension Mother        SOCIAL HISTORY    Social History     Tobacco Use    Smoking status: Former Smoker     Packs/day: 1.00     Years: 7.00     Pack years: 7.00     Types: Cigarettes     Quit date: 3/24/1975     Years since quittin.4    Smokeless tobacco: Never Used   Vaping Use    Vaping Use: Never used   Substance Use Topics    Alcohol use: Yes     Comment: rarely    Drug use: No       ALLERGIES    Allergies   Allergen Reactions    Avelox [Moxifloxacin] Hives    Celebrex [Celecoxib] Hives    Penicillins Unknown (comments)     Upset stomach       MEDICATIONS    Current Outpatient Medications on File Prior to Encounter   Medication Sig Dispense Refill    benralizumab (Fasenra) 30 mg/mL syrg 30 mg by SubCUTAneous route every two (2) months.       tamsulosin (FLOMAX) 0.4 mg capsule Take 1 Cap by mouth daily. 90 Cap 3    irbesartan (AVAPRO) 300 mg tablet Take 1 Tab by mouth nightly. 90 Tab 3    amLODIPine (NORVASC) 10 mg tablet Take 1 Tab by mouth daily. 90 Tab 3    budesonide-formoterol (SYMBICORT) 160-4.5 mcg/actuation HFAA Take 2 Puffs by inhalation two (2) times a day.  fluticasone (FLONASE) 50 mcg/actuation nasal spray 2 puffs each nostril at bedtime 3 Bottle 3    montelukast (SINGULAIR) 10 mg tablet Take 1 Tab by mouth daily. 90 Tab 3    multivitamin (ONE A DAY) tablet Take 1 Tab by mouth daily.  albuterol (PROAIR HFA) 90 mcg/actuation inhaler Take  by inhalation.  cholecalciferol, vitamin D3, (VITAMIN D3) 2,000 unit tab Take  by mouth. No current facility-administered medications on file prior to encounter. REVIEW OF SYSTEMS    A comprehensive review of systems was negative except for that written in the HPI. Objective:     Visit Vitals  /79 (BP 1 Location: Left arm)   Pulse 92   Temp 98.3 °F (36.8 °C)   Resp 18   Ht 6' (1.829 m)   Wt 214 lb (97.1 kg)   SpO2 95%   BMI 29.02 kg/m²       Wt Readings from Last 3 Encounters:   08/24/21 214 lb (97.1 kg)   08/10/21 215 lb (97.5 kg)   08/03/21 212 lb (96.2 kg)       PHYSICAL EXAM    General: well developed, well nourished, pleasant, NAD. Hygiene good  Psych: cooperative. Pleasant. No anxiety or depression. Normal mood and affect. HEENT: Normocephalic, atraumatic. EOMI. Conjunctiva clear. No scleral icterus. Neck: Normal range of motion. No masses. Chest: Good air entry bilaterally. Respirations nonlabored  Cardio: Normal heart sounds,no rubs, murmurs or gallops  Abdomen: Soft, nontender, nondistended, normoactive bowel sounds  Vascular: Femoral pulse present , DP and PT pulses are present at 2/4 bilateral. Skin temperature is uniform from proximal to distal bilateral. Hair growth is present bilateral. No erythema, edema, Tempeture warm , No evidence of cellulitis. Derm:  Skin is atrophic and xerotic.  Mild scaling around LLE wound  Neuro: Epicritic sensation is intact bilateral.   Msk: Muscle strength is 5/5 for plantar and dorsiflexion of foot bilateral. ROM of all joints is full and fluid without pain. No effusions are palpable. Assessment:      Problem List Items Addressed This Visit        Other    * (Principal) Leg wound, left, initial encounter - Primary    Relevant Orders    INITIATE OUTPATIENT WOUND CARE PROTOCOL          Procedure Note  Indications:  Based on my examination of this patient's wound(s)/ulcer(s) today, debridement is not required to promote healing and evaluate the wound base. Initial assessment 8/10/2021:   Wound Leg lower Left 08/10/21 (Active)   Wound Image   08/10/21 1337   Wound Etiology Traumatic 08/10/21 1337   Cleansed Cleansed with saline 08/10/21 1337   Dressing/Treatment Open to air 08/10/21 1337   Wound Length (cm) 3.5 cm 08/10/21 1337   Wound Width (cm) 0.8 cm 08/10/21 1337   Wound Depth (cm) 0.1 cm 08/10/21 1337   Wound Surface Area (cm^2) 2.8 cm^2 08/10/21 1337   Wound Volume (cm^3) 0.28 cm^3 08/10/21 1337   Wound Assessment Granulation tissue;Slough 08/10/21 1337   Drainage Amount Moderate 08/10/21 1337   Drainage Description Serosanguinous 08/10/21 1337   Wound Odor None 08/10/21 1337   Melba-Wound/Incision Assessment Intact 08/10/21 1337   Wound Thickness Description Full thickness 08/10/21 1337   Number of days: 0      Assessment 8/24/2021:  WOUND POA CONDITIONS    Wound Leg lower Left 08/10/21 (Active)   Wound Image   08/24/21 1432   Wound Etiology Traumatic 08/24/21 1432   Dressing Status Intact 08/24/21 1432   Cleansed Cleansed with saline 08/24/21 1432   Dressing/Treatment Hydrofera Blue;Band-Aid/Adhesive bandage 08/24/21 1432   Wound Length (cm) 1.8 cm 08/24/21 1432   Wound Width (cm) 0.5 cm 08/24/21 1432   Wound Depth (cm) 0.1 cm 08/24/21 1432   Wound Surface Area (cm^2) 0.9 cm^2 08/24/21 1432   Change in Wound Size % 67.86 08/24/21 1432   Wound Volume (cm^3) 0.09 cm^3 08/24/21 1432   Wound Healing % 68 08/24/21 1432   Wound Assessment Epithelialization;Granulation tissue 08/24/21 1432   Drainage Amount Small 08/24/21 1432   Drainage Description Serosanguinous 08/24/21 1432   Wound Odor None 08/24/21 1432   Melba-Wound/Incision Assessment Intact 08/24/21 1432   Wound Thickness Description Full thickness 08/24/21 1432   Number of days: 14       Image 8/24/2021:            Amount and/or Complexity of Data Reviewed and Analyzed:  I reviewed and analyzed all of the unique labs and radiologic studies that are shown below as well as any that are in the HPI, and any that are in the expanded problem list below  *Each unique test, order, or document contributes to the combination of 2 or combination of 3 in Category 1 below. I also independently reviewed radiology images for studies I described above in the HPI or studies I have ordered. For this visit I also reviewed old records and prior notes. No results for input(s): WBC, HGB, PLT, NA, K, CL, CO2, BUN, CREA, GLU, PTP, INR, APTT, TBIL, TBILI, CBIL, ALT, AP, AML, AML, LPSE, LCAD, NH4, TROPT, TROIQ, PCO2, PO2, HCO3, HGBEXT, PLTEXT, INREXT, HGBEXT, PLTEXT, INREXT in the last 72 hours. No lab exists for component: SGOT, GPT,  PH  No results for input(s): PTP, INR, APTT, TBIL, TBILI, CBIL, ALT, AP, AML, LPSE, INREXT, INREXT in the last 72 hours.     No lab exists for component: SGOT, GPT    Most recent blood counts (CBC) review  Lab Results   Component Value Date/Time    WBC 6.3 12/23/2020 11:35 AM    HGB 15.8 12/23/2020 11:35 AM    HCT 46.9 12/23/2020 11:35 AM    PLATELET 657 95/90/9147 11:35 AM    MCV 91 12/23/2020 11:35 AM     Most recent chemistry (BMP) test review   Lab Results   Component Value Date/Time    Sodium 144 12/23/2020 11:35 AM    Potassium 4.4 12/23/2020 11:35 AM    Chloride 103 12/23/2020 11:35 AM    CO2 26 12/23/2020 11:35 AM    Glucose 96 12/23/2020 11:35 AM    BUN 14 12/23/2020 11:35 AM    Creatinine 0.86 12/23/2020 11:35 AM    BUN/Creatinine ratio 16 12/23/2020 11:35 AM    GFR est AA 99 12/23/2020 11:35 AM    GFR est non-AA 86 12/23/2020 11:35 AM    Calcium 9.2 12/23/2020 11:35 AM     Most recent Liver enzyme test review  Lab Results   Component Value Date/Time    ALT (SGPT) 17 12/23/2020 11:35 AM    AST (SGOT) 14 12/23/2020 11:35 AM    Alk. phosphatase 110 12/23/2020 11:35 AM    Bilirubin, total 0.8 12/23/2020 11:35 AM     Most recent coagulation (coags) review  No results found for: INR, PTMR, PTP, PT1, PT2, INREXT, INREXT  No results found for: INR, APTT, CBIL, AML, AML, LPSE, LCAD, NH4, TROPT, TROIQ, INREXT, INREXT    Diabetic assessment  No results found for: HBA1C, FMQ3VKWJ, YOC3TZGL, MEQ5IHPJ    Nutritional assessment screen to assess wound healing ability:  Lab Results   Component Value Date/Time    Protein, total 6.7 12/23/2020 11:35 AM    Albumin 4.3 12/23/2020 11:35 AM     Lab Results   Component Value Date/Time    Protein, total 6.7 12/23/2020 11:35 AM    Albumin 4.3 12/23/2020 11:35 AM       XR Results (most recent):  Results from Hospital Encounter encounter on 01/22/21    XR CHEST PA LAT    Narrative  TWO-VIEW CHEST:    CLINICAL HISTORY: Follow-up right lower lobe pneumonia. COMPARISON:  December 20, 2020. FINDINGS: PA and lateral chest images demonstrate interval improvement in right  right basilar atelectasis/infiltrate with mild residual bibasilar  atelectasis/scarring. Elevation of the right hemidiaphragm is again noted in  association with interposition of the hepatic flexure between the liver and  hemidiaphragm. The heart size is within normal limits without evidence of  congestive heart failure or pneumothorax. The bony thorax appears intact on  these views. Impression  INTERVAL IMPROVEMENT IN RIGHT BASILAR ATELECTASIS/INFILTRATE WITH  MILD RESIDUAL BIBASILAR ATELECTASIS/SCAR ADJACENT TO THE MILDLY ELEVATED  HEMIDIAPHRAGM.       CT Results (most recent):  Results from [facility] Encounter encounter on 05/06/21    CT CHEST WO CONT    Narrative  EXAMINATION: CT CHEST WITHOUT INTRAVENOUS CONTRAST 5/6/2021 9:15 AM    ACCESSION NUMBER: [id number]    INDICATION: Pneumonia due to COVID 19    COMPARISON: Chest CT 2/1/2021    TECHNIQUE: Multiple contiguous axial CT images of the chest were obtained from  the lung apices to the lung bases after without intravenous contrast.    Radiation dose reduction techniques were used for this study:  Our CT scanners  use one or all of the following: Automated exposure control, adjustment of the  mA and/or kVp according to patient's size, iterative reconstruction. FINDINGS:    THORACIC AORTA: Mild scattered atherosclerosis. PROXIMAL GREAT VESSELS: Unremarkable    HEART: Left anterior descending coronary artery atherosclerosis. PERICARDIUM: Unremarkable    MEDIASTINAL LYMPH NODES: Unremarkable  HILAR LYMPH NODES: Unremarkable  AXILLARY LYMPH NODES: Unremarkable    PULMONARY PARENCHYMA: Minimal linear scarring in the lingula, unchanged from the  prior. 0.2 cm left lower lobe subpleural nodule (axial image 27). Voice calcifications along the base of the right middle lobe unchanged. Linear  opacities and reticulonodular densities in the periphery of the right upper lobe  and right middle lobe persists but are somewhat improved in comparison to the  prior. No new suspicious nodules or masses in the right lung. The large central airways are predominantly clear. PLEURA: No pneumothorax. No pleural effusion. VISUALIZED UPPER ABDOMEN: There is no free intraperitoneal gas in the included  portions of the upper abdomen. Cholelithiasis. OSSEOUS STRUCTURES: Thoracic spine spondylosis without suspicious lytic or  blastic bony lesions. Impression  1. Some improvement of ground glass opacities and reticular nodular densities in  the periphery of the right upper lobe and right middle lobe.  The remaining areas  of abnormal pulmonary parenchyma may represent subacute to chronic sequelae of  the prior COVID 19 infection. To what extent these may improve in the future is  uncertain. 2. Unchanged linear scarring in the lingula. 3. New 0.2 cm left lower lobe subpleural nodule, favored to be inflammatory. VOICE DICTATED BY: Dr. Rosanna Amos        Admission date (for inpatients): 8/24/2021   * No surgery found *  * No surgery found *    Plan:     70 y.o. male  with a history of COPD, SHYAM,prior tobacco abuse, prior R lobectomy,  chronic sinusitis/polyps, allergic rhinitis, HTN, and GERD. Traumatic Laceration of left lower extremity, initial encounter  Patient with culture of wound with Jes Rack; antibiotic coverage appropriate  Patient completed course of antibiotics  No need for further cultures  Will continue hydrofera ready to wound bed, patient to continue wound dressings  Will apply minimal compression with tubigrip  Patient to return to clinic in 2 weeks for assessment  Patient with great pulses in feet, no swelling in LLE or RLE    Follow-up Information     Follow up With Specialties Details Why Contact Info    [facility]  For wound re-check [facility] Dr Tisha Reid [address] [place], [place] [id number] [phone]              Active Orders   Nursing    INITIATE OUTPATIENT WOUND CARE PROTOCOL     Frequency: ONE TIME     Number of Occurrences: 1 Occurrences     Order Comments: Cleanse wound with saline. If wound contains bioburden or contamination, cleanse with wound cleanser or antimicrobial solution. For normal periwound tissue without irritation nor maceration, apply topical skin protectant. For periwound tissue with irritation and/or maceration, apply zinc based product, topical steroid cream/ointment, or equivalent. For wounds with dry firm black eschar and/or without exudate, apply betadine and leave open to air.     For wounds with scant/small to no exudate or drainage, apply wound gel, hydrocolloid, polymer, or equivalent and cover with secondary dressing/foam.    For wounds with moderate/large exudate or drainage, apply alginate, hydrofiber, polymer, or equivalent and cover with secondary dressing/foam.    For wounds with nonviable tissue requiring removal, apply chemical or mechanical debrider and cover with secondary dressing/foam.    For wounds with tunneling, dead space, or cavity, fill or pack with strip/guaze/kerlix to fit and co... WOUND CARE, DRESSING CHANGE     Frequency: ONE TIME     Number of Occurrences: 1 Occurrences     Order Comments: Left Lower Leg:  Cleanse with Normal Saline  Hydrofera Ready: Cut to wound size, place in wound bed, shiny side out. Cover with Gauze/ABD  Secure with Coversite/Rolled Gauze  Change 3x/week  Tubigrip to Left Lower Leg         Treatment Note please see attached Discharge Instructions    Written patient dismissal instructions given to patient or POA. Orders Placed This Encounter    INITIATE OUTPATIENT WOUND CARE PROTOCOL     Cleanse wound with saline. If wound contains bioburden or contamination, cleanse with wound cleanser or antimicrobial solution. For normal periwound tissue without irritation nor maceration, apply topical skin protectant. For periwound tissue with irritation and/or maceration, apply zinc based product, topical steroid cream/ointment, or equivalent. For wounds with dry firm black eschar and/or without exudate, apply betadine and leave open to air.     For wounds with scant/small to no exudate or drainage, apply wound gel, hydrocolloid, polymer, or equivalent and cover with secondary dressing/foam.    For wounds with moderate/large exudate or drainage, apply alginate, hydrofiber, polymer, or equivalent and cover with secondary dressing/foam.    For wounds with nonviable tissue requiring removal, apply chemical or mechanical debrider and cover with secondary dressing/foam.    For wounds with tunneling, dead space, or cavity, fill or pack with strip/guaze/kerlix to fit and cover with secondary dressing/foam.    For wounds with adequate granulation or epithelization, apply wound gel, hydrocolloid, polymer, collagen, or transparent film, and cover secondary dry dressing/foam.    For wounds that need additional secondary dressing to help pad or control additional drainage/exudates, add foam, absorbent pad or hydrocolloid. For wounds with suspected or know infection, apply antimicrobial mesh and/or antimicrobial alginate/hydrofiber, or antimicrobial solution moistened gauze/kerlix, or equivalent and cover with secondary dressing foam.    Compression management needed for edema control, apply multilayer compression or tubular garment or equivalent. Offloading Management needed for pressure relief, apply offloading shoe/boot or equivalent. Standing Status:   Standing     Number of Occurrences:   1    WOUND CARE, DRESSING CHANGE     Left Lower Leg:  Cleanse with Normal Saline  Hydrofera Ready: Cut to wound size, place in wound bed, shiny side out. Cover with Gauze/ABD  Secure with Coversite/Rolled Gauze  Change 3x/week  Tubigrip to Left Lower Leg     Standing Status:   Standing     Number of Occurrences:   1       No orders of the defined types were placed in this encounter. Level of MDM (Based on 2/3 elements below)  Number and Complexity of Problems Addressed Amount and/or Complexity of Data to be Reviewed and Analyzed  *Each unique test, order, or document contributes to the combination of 2 or combination of 3 in Category 1 below.  Risk of Complications and/or Morbidity or Mortality of pt Management     12962  51721 SF Minimal  1self-limited or minor problem Minimal or none Minimal risk of morbidity from additional diagnostic testing or Rx   91430  33032 Low Low  2or more self-limited or minor problems;    or  1stable chronic illness;    or  0RIGWG, uncomplicated illness or injury   Limited  (Must meet the requirements of at least 1 of the 2 categories)  Category 1: Tests and documents   Any combination of 2 from the following:  Review of prior external note(s) from each unique source*;  review of the result(s) of each unique test*;   ordering of each unique test*    or   Category 2: Assessment requiring an independent historian(s)  (For the categories of independent interpretation of tests and discussion of management or test interpretation, see moderate or high) Low risk of morbidity from additional diagnostic testing or treatment     15250  12565 Mod Moderate  1or more chronic illnesses with exacerbation, progression, or side effects of treatment;    or  2or more stable chronic illnesses;    or      1undiagnosed new problem with uncertain prognosis;    or  1acute illness with systemic symptoms;    or  [id number] complicated injury   Moderate:  (Must meet the requirements of 1 out of 3 categories)    Category 1: Tests, documents, or independent historian(s)  Any combination of 3 from the following:   Review of prior external note(s) from each unique source*;  Review of the result(s) of each unique test*;  Ordering of each unique test*;  Assessment requiring an independent historian(s)    or  Category 2: Independent interpretation of tests   Independent interpretation of a test performed by another physician/other qualified health care professional (not separately reported);     or  Category 3: Discussion of management or test interpretation  Discussion of management or test interpretation with external physician/other qualified health care professional/appropriate source (not separately reported)   Moderate risk of morbidity from additional diagnostic testing or treatment  Examples only:  Prescription drug management   Decision regarding minor surgery with identified patient or procedure risk factors  Decision regarding elective major surgery without identified patient or procedure risk factors   Diagnosis or treatment significantly limited by social determinants of health       18570  17215 High High  1or more chronic illnesses with severe exacerbation, progression, or side effects of treatment;    or  1 acute or chronic illness or injury that poses a threat to life or bodily function   Extensive  (Must meet the requirements of at least 2 out of 3 categories)    Category 1: Tests, documents, or independent historian(s)  Any combination of 3 from the following:   Review of prior external note(s) from each unique source*;  Review of the result(s) of each unique test*;   Ordering of each unique test*;   Assessment requiring an independent historian(s)    or   Category 2: Independent interpretation of tests   Independent interpretation of a test performed by another physician/other qualified health care professional (not separately reported);     or  Category 3: Discussion of management or test interpretation  Discussion of management or test interpretation with external physician/other qualified health care professional/appropriate source (not separately reported)   High risk of morbidity from additional diagnostic testing or treatment  Examples only:  Drug therapy requiring intensive monitoring for toxicity  Decision regarding elective major surgery with identified patient or procedure risk factors  Decision regarding emergency major surgery  Decision regarding hospitalization  Decision not to resuscitate or to de-escalate care because of poor prognosis             I have personally performed a face-to-face diagnostic evaluation and management  service on this patient. I have independently seen the patient. I have independently obtained the above history from the patient/family. I have independently examined the patient with above findings. I have independently reviewed data/labs for this patient and developed the above plan of care (MDM).   Electronically signed by Chastity Yang DO on 8/24/2021 at 4:03 PM

## 2021-09-07 ENCOUNTER — HOSPITAL ENCOUNTER (OUTPATIENT)
Dept: WOUND CARE | Age: 76
Discharge: HOME | End: 2021-09-07
Attending: SURGERY
Payer: MEDICARE

## 2021-09-07 VITALS
WEIGHT: 217 LBS | HEIGHT: 72 IN | DIASTOLIC BLOOD PRESSURE: 83 MMHG | RESPIRATION RATE: 20 BRPM | TEMPERATURE: 96.1 F | SYSTOLIC BLOOD PRESSURE: 136 MMHG | BODY MASS INDEX: 29.39 KG/M2

## 2021-09-07 DIAGNOSIS — S81.802A LEG WOUND, LEFT, INITIAL ENCOUNTER: Primary | ICD-10-CM

## 2021-09-07 PROCEDURE — 99213 OFFICE O/P EST LOW 20 MIN: CPT | Performed by: SURGERY

## 2021-09-07 PROCEDURE — 99212 OFFICE O/P EST SF 10 MIN: CPT

## 2021-09-07 RX ORDER — LIDOCAINE 50 MG/G
OINTMENT TOPICAL ONCE
Status: CANCELLED | OUTPATIENT
Start: 2021-09-07 | End: 2021-09-07

## 2021-09-07 RX ORDER — BACITRACIN ZINC AND POLYMYXIN B SULFATE 500; 1000 [USP'U]/G; [USP'U]/G
OINTMENT TOPICAL ONCE
Status: CANCELLED | OUTPATIENT
Start: 2021-09-07 | End: 2021-09-07

## 2021-09-07 RX ORDER — SILVER SULFADIAZINE 10 G/1000G
CREAM TOPICAL ONCE
Status: CANCELLED | OUTPATIENT
Start: 2021-09-07 | End: 2021-09-07

## 2021-09-07 RX ORDER — LIDOCAINE HYDROCHLORIDE 20 MG/ML
JELLY TOPICAL ONCE
Status: CANCELLED | OUTPATIENT
Start: 2021-09-07 | End: 2021-09-07

## 2021-09-07 RX ORDER — TRIAMCINOLONE ACETONIDE 1 MG/G
OINTMENT TOPICAL ONCE
Status: CANCELLED | OUTPATIENT
Start: 2021-09-07 | End: 2021-09-07

## 2021-09-07 RX ORDER — BACITRACIN 500 [USP'U]/G
OINTMENT TOPICAL ONCE
Status: CANCELLED | OUTPATIENT
Start: 2021-09-07 | End: 2021-09-07

## 2021-09-07 RX ORDER — CLOBETASOL PROPIONATE 0.5 MG/G
OINTMENT TOPICAL ONCE
Status: CANCELLED | OUTPATIENT
Start: 2021-09-07 | End: 2021-09-07

## 2021-09-07 RX ORDER — GENTAMICIN SULFATE 1 MG/G
OINTMENT TOPICAL ONCE
Status: CANCELLED | OUTPATIENT
Start: 2021-09-07 | End: 2021-09-07

## 2021-09-07 RX ORDER — MUPIROCIN 20 MG/G
OINTMENT TOPICAL ONCE
Status: CANCELLED | OUTPATIENT
Start: 2021-09-07 | End: 2021-09-07

## 2021-09-07 RX ORDER — BETAMETHASONE DIPROPIONATE 0.5 MG/G
OINTMENT TOPICAL ONCE
Status: CANCELLED | OUTPATIENT
Start: 2021-09-07 | End: 2021-09-07

## 2021-09-07 RX ORDER — LIDOCAINE HYDROCHLORIDE 40 MG/ML
SOLUTION TOPICAL ONCE
Status: CANCELLED | OUTPATIENT
Start: 2021-09-07 | End: 2021-09-07

## 2021-09-07 RX ORDER — LIDOCAINE 40 MG/G
CREAM TOPICAL ONCE
Status: CANCELLED | OUTPATIENT
Start: 2021-09-07 | End: 2021-09-07

## 2021-09-07 NOTE — WOUND CARE
[address]  [address], [address]   Phone: [phone]  Fax: [phone]    Patient: Cristhian Woodson MRN: [id number]  SSN: [id number]    YOB: 1945  Age: 70 y.o. Sex: male       Return Appointment: Discharge with Dr Tamika Vargas    Instructions: Wound Healed-[facility] from [facility] to [facility]  Do Not Scrub New Skin    Should you experience increased redness, swelling, pain, foul odor, size of wound(s), or have a temperature over 101 degrees please contact the [address] [place] [address] Road at [phone] or if after hours contact your primary care physician or go to the hospital emergency department.     Signed By: Sussy Gomez RN     September 7, 2021

## 2021-09-07 NOTE — WOUND CARE
09/07/21 1354   Wound Leg lower Left 08/10/21   Date First Assessed: 08/10/21   Wound Approximate Age at First Assessment (Weeks): 5 weeks  Primary Wound Type: Traumatic  Location: Leg lower  Wound Location Orientation: Left  Date of First Observation: 08/10/21   Wound Image    Wound Etiology Traumatic   Dressing Status Intact   Cleansed Cleansed with saline   Dressing/Treatment   (Hydrofera Ready, Coversite)   Wound Length (cm) 0 cm   Wound Width (cm) 0 cm   Wound Depth (cm) 0 cm   Wound Surface Area (cm^2) 0 cm^2   Change in Wound Size % 100   Wound Volume (cm^3) 0 cm^3   Wound Healing % 100   Wound Assessment Epithelialization   Drainage Amount None   Wound Odor None   Melba-Wound/Incision Assessment Intact   Wound Thickness Description Full thickness

## 2021-09-07 NOTE — PROGRESS NOTES
[address]   Progress Note and Procedure Note   NO Procedure      Amanda Baeza III  MEDICAL RECORD NUMBER:  [id number]  AGE: 70 y.o. RACE WHITE/NON-  GENDER: male  : 1945  EPISODE DATE:  2021    Subjective:     Chief Complaint   Patient presents with    Wound Check     LLE         HISTORY of PRESENT ILLNESS HPI    Amanda Baeza III is a 70 y.o. male  with a history of COPD, SHYAM,prior tobacco abuse, prior R lobectomy,  chronic sinusitis/polyps, allergic rhinitis, HTN, and GERD. Pt was diagnosed with COVID 2020. Pt was treated with abx and steroids for COVID PNA on 2020. Patient has been referred to wound clinic d/t non healing wound on LLE. He obtained a laceration to his leg doing yard work about 4 weeks prior. He has been using bactroban on the wound. He has a history of bilateral knee replacements with the left knee having to be replace due to infection and he is worried this may cause an infection to his hardware. He has been seen by his PCP and has had two cultures, the last returning with pan sensitive Citrobacter Koseri. He is currently taking doxyclcline and this seems to have made a big difference in healing per patient. He does not have history of PVD or Venous insufficiency. He is retired. He worked as an . He denies fever, chills, or sweats. Follow up visit: Patient returns for follow up visit 2021. His wound is healing well and is greatly decreased in size from previous. Mr. Katelin Black is please with the progression of the wound. He has been placing hydrofera dressing 3x weekly. Minimal drainage from the wound. Wound with healing edges and improved granulation tissues. 2021: Patient returns for follow up. His wound has completely epithelized. His wound is now closed. Discussed attempting to keep from sunlight for at least 6 weeks for continued healing. Patient please with his wound healing.  Patient can now follow up PRN. History of Wound Context: LLE traumatic wound 4 weeks prior. Wound/Ulcer Pain Timing/Severity: mild  Quality of pain: sharp  Severity:  5 / 10   Modifying Factors: Pain worsens with walking  Associated Signs/Symptoms: edema, erythema, decreased drainage and pain    Ulcer Identification:  Ulcer Type: traumatic    Contributing Factors: none    Wound: LLE traumatic wound, scaling around wound, minor erythema and edema.           PAST MEDICAL HISTORY    Past Medical History:   Diagnosis Date    Asthma     Bronchitis     COPD (chronic obstructive pulmonary disease) ([facility] [address]     Depression     Elevated PSA 2016    GERD (gastroesophageal reflux disease)     Hypertension     Malignant melanoma of skin (HCC)     SHYAM (obstructive sleep apnea)     Personal history of colonic polyps     Prostatitis     Sinusitis, chronic         PAST SURGICAL HISTORY    Past Surgical History:   Procedure Laterality Date    HX CATARACT REMOVAL Bilateral ,    HX HEENT      sinus surgery    HX KNEE REPLACEMENT      right 2006 left 2013    HX KNEE REPLACEMENT Left     HX LOBECTOMY      right lung    HX MALIGNANT SKIN LESION EXCISION      rt shoulder, forehead     HX RHINOPLASTY  2009    HX RHINOPLASTY  2018       FAMILY HISTORY    Family History   Problem Relation Age of Onset    Colon Cancer Father     Prostate Cancer Father     Hypertension Mother        SOCIAL HISTORY    Social History     Tobacco Use    Smoking status: Former Smoker     Packs/day: 1.00     Years: 7.00     Pack years: 7.00     Types: Cigarettes     Quit date: 3/24/1975     Years since quittin.4    Smokeless tobacco: Never Used   Vaping Use    Vaping Use: Never used   Substance Use Topics    Alcohol use: Yes     Comment: rarely    Drug use: No       ALLERGIES    Allergies   Allergen Reactions    Avelox [Moxifloxacin] Hives    Celebrex [Celecoxib] Hives    Penicillins Unknown (comments)     Upset stomach MEDICATIONS    Current Outpatient Medications on File Prior to Encounter   Medication Sig Dispense Refill    benralizumab (Fasenra) 30 mg/mL syrg 30 mg by SubCUTAneous route every two (2) months.  tamsulosin (FLOMAX) 0.4 mg capsule Take 1 Cap by mouth daily. 90 Cap 3    irbesartan (AVAPRO) 300 mg tablet Take 1 Tab by mouth nightly. 90 Tab 3    amLODIPine (NORVASC) 10 mg tablet Take 1 Tab by mouth daily. 90 Tab 3    budesonide-formoterol (SYMBICORT) 160-4.5 mcg/actuation HFAA Take 2 Puffs by inhalation two (2) times a day.  fluticasone (FLONASE) 50 mcg/actuation nasal spray 2 puffs each nostril at bedtime 3 Bottle 3    montelukast (SINGULAIR) 10 mg tablet Take 1 Tab by mouth daily. 90 Tab 3    multivitamin (ONE A DAY) tablet Take 1 Tab by mouth daily.  albuterol (PROAIR HFA) 90 mcg/actuation inhaler Take  by inhalation.  cholecalciferol, vitamin D3, (VITAMIN D3) 2,000 unit tab Take  by mouth. No current facility-administered medications on file prior to encounter. REVIEW OF SYSTEMS    A comprehensive review of systems was negative except for that written in the HPI. Objective:     Visit Vitals  /83 (BP 1 Location: Right upper arm)   Temp (!) 96.1 °F (35.6 °C)   Resp 20   Ht 6' (1.829 m)   Wt 217 lb (98.4 kg)   BMI 29.43 kg/m²       Wt Readings from Last 3 Encounters:   09/07/21 217 lb (98.4 kg)   08/24/21 214 lb (97.1 kg)   08/10/21 215 lb (97.5 kg)       PHYSICAL EXAM    General: well developed, well nourished, pleasant, NAD. Hygiene good  Psych: cooperative. Pleasant. No anxiety or depression. Normal mood and affect. HEENT: Normocephalic, atraumatic. EOMI. Conjunctiva clear. No scleral icterus. Neck: Normal range of motion. No masses. Chest: Good air entry bilaterally.   Respirations nonlabored  Cardio: Normal heart sounds,no rubs, murmurs or gallops  Abdomen: Soft, nontender, nondistended, normoactive bowel sounds  Vascular: Femoral pulse present , DP and PT pulses are present at 2/4 bilateral. Skin temperature is uniform from proximal to distal bilateral. Hair growth is present bilateral. No erythema, edema, Tempeture warm , No evidence of cellulitis. Derm:  Skin is atrophic and xerotic. Mild scaling around LLE wound  Neuro: Epicritic sensation is intact bilateral.   Msk: Muscle strength is 5/5 for plantar and dorsiflexion of foot bilateral. ROM of all joints is full and fluid without pain. No effusions are palpable. Assessment:      Problem List Items Addressed This Visit        Other    * (Principal) Leg wound, left, initial encounter - Primary    Relevant Orders    INITIATE OUTPATIENT WOUND CARE PROTOCOL          Procedure Note  Indications:  Based on my examination of this patient's wound(s)/ulcer(s) today, debridement is not required to promote healing and evaluate the wound base. Initial assessment 8/10/2021:   Wound Leg lower Left 08/10/21 (Active)   Wound Image   08/10/21 1337   Wound Etiology Traumatic 08/10/21 1337   Cleansed Cleansed with saline 08/10/21 1337   Dressing/Treatment Open to air 08/10/21 1337   Wound Length (cm) 3.5 cm 08/10/21 1337   Wound Width (cm) 0.8 cm 08/10/21 1337   Wound Depth (cm) 0.1 cm 08/10/21 1337   Wound Surface Area (cm^2) 2.8 cm^2 08/10/21 1337   Wound Volume (cm^3) 0.28 cm^3 08/10/21 1337   Wound Assessment Granulation tissue;Slough 08/10/21 1337   Drainage Amount Moderate 08/10/21 1337   Drainage Description Serosanguinous 08/10/21 1337   Wound Odor None 08/10/21 1337   Melba-Wound/Incision Assessment Intact 08/10/21 1337   Wound Thickness Description Full thickness 08/10/21 1337   Number of days: 0      Assessment 8/24/2021:  WOUND POA CONDITIONS    Wound Leg lower Left 08/10/21 (Active)   Wound Image   08/24/21 1432   Wound Etiology Traumatic 08/24/21 1432   Dressing Status Intact 08/24/21 1432   Cleansed Cleansed with saline 08/24/21 1432   Dressing/Treatment Hydrofera Blue;Band-Aid/Adhesive bandage 08/24/21 1432 Wound Length (cm) 1.8 cm 08/24/21 1432   Wound Width (cm) 0.5 cm 08/24/21 1432   Wound Depth (cm) 0.1 cm 08/24/21 1432   Wound Surface Area (cm^2) 0.9 cm^2 08/24/21 1432   Change in Wound Size % 67.86 08/24/21 1432   Wound Volume (cm^3) 0.09 cm^3 08/24/21 1432   Wound Healing % 68 08/24/21 1432   Wound Assessment Epithelialization;Granulation tissue 08/24/21 1432   Drainage Amount Small 08/24/21 1432   Drainage Description Serosanguinous 08/24/21 1432   Wound Odor None 08/24/21 1432   Melba-Wound/Incision Assessment Intact 08/24/21 1432   Wound Thickness Description Full thickness 08/24/21 1432   Number of days: 14     WOUND POA CONDITIONS    [REMOVED] Wound Leg lower Left 08/10/21 (Removed)   Wound Image   09/07/21 1354   Wound Etiology Traumatic 09/07/21 1354   Dressing Status Intact 09/07/21 1354   Cleansed Cleansed with saline 09/07/21 1354   Dressing/Treatment Hydrofera Blue;Band-Aid/Adhesive bandage 08/24/21 1432   Wound Length (cm) 0 cm 09/07/21 1354   Wound Width (cm) 0 cm 09/07/21 1354   Wound Depth (cm) 0 cm 09/07/21 1354   Wound Surface Area (cm^2) 0 cm^2 09/07/21 1354   Change in Wound Size % 100 09/07/21 1354   Wound Volume (cm^3) 0 cm^3 09/07/21 1354   Wound Healing % 100 09/07/21 1354   Wound Assessment Epithelialization 09/07/21 1354   Drainage Amount None 09/07/21 1354   Drainage Description Serosanguinous 08/24/21 1432   Wound Odor None 09/07/21 1354   Melba-Wound/Incision Assessment Intact 09/07/21 1354   Wound Thickness Description Full thickness 09/07/21 1354   Number of days: 28               Image 8/24/2021:      9/7/2021            Amount and/or Complexity of Data Reviewed and Analyzed:  I reviewed and analyzed all of the unique labs and radiologic studies that are shown below as well as any that are in the HPI, and any that are in the expanded problem list below  *Each unique test, order, or document contributes to the combination of 2 or combination of 3 in Category 1 below.   I also independently reviewed radiology images for studies I described above in the HPI or studies I have ordered. For this visit I also reviewed old records and prior notes. No results for input(s): WBC, HGB, PLT, NA, K, CL, CO2, BUN, CREA, GLU, PTP, INR, APTT, TBIL, TBILI, CBIL, ALT, AP, AML, AML, LPSE, LCAD, NH4, TROPT, TROIQ, PCO2, PO2, HCO3, HGBEXT, PLTEXT, INREXT, HGBEXT, PLTEXT, INREXT in the last 72 hours. No lab exists for component: SGOT, GPT,  PH  No results for input(s): PTP, INR, APTT, TBIL, TBILI, CBIL, ALT, AP, AML, LPSE, INREXT, INREXT in the last 72 hours. No lab exists for component: SGOT, GPT    Most recent blood counts (CBC) review  Lab Results   Component Value Date/Time    WBC 6.3 12/23/2020 11:35 AM    HGB 15.8 12/23/2020 11:35 AM    HCT 46.9 12/23/2020 11:35 AM    PLATELET 685 37/21/5229 11:35 AM    MCV 91 12/23/2020 11:35 AM     Most recent chemistry (BMP) test review   Lab Results   Component Value Date/Time    Sodium 144 12/23/2020 11:35 AM    Potassium 4.4 12/23/2020 11:35 AM    Chloride 103 12/23/2020 11:35 AM    CO2 26 12/23/2020 11:35 AM    Glucose 96 12/23/2020 11:35 AM    BUN 14 12/23/2020 11:35 AM    Creatinine 0.86 12/23/2020 11:35 AM    BUN/Creatinine ratio 16 12/23/2020 11:35 AM    GFR est AA 99 12/23/2020 11:35 AM    GFR est non-AA 86 12/23/2020 11:35 AM    Calcium 9.2 12/23/2020 11:35 AM     Most recent Liver enzyme test review  Lab Results   Component Value Date/Time    ALT (SGPT) 17 12/23/2020 11:35 AM    AST (SGOT) 14 12/23/2020 11:35 AM    Alk.  phosphatase 110 12/23/2020 11:35 AM    Bilirubin, total 0.8 12/23/2020 11:35 AM     Most recent coagulation (coags) review  No results found for: INR, PTMR, PTP, PT1, PT2, INREXT, INREXT  No results found for: INR, APTT, CBIL, AML, AML, LPSE, LCAD, NH4, TROPT, TROIQ, INREXT, INREXT    Diabetic assessment  No results found for: HBA1C, CBY5OTTP, WSK3WVNN, BWZ0IOCS    Nutritional assessment screen to assess wound healing ability:  Lab Results   Component Value Date/Time    Protein, total 6.7 12/23/2020 11:35 AM    Albumin 4.3 12/23/2020 11:35 AM     Lab Results   Component Value Date/Time    Protein, total 6.7 12/23/2020 11:35 AM    Albumin 4.3 12/23/2020 11:35 AM       XR Results (most recent):  Results from Hospital Encounter encounter on 01/22/21    XR CHEST PA LAT    Narrative  TWO-VIEW CHEST:    CLINICAL HISTORY: Follow-up right lower lobe pneumonia. COMPARISON:  December 20, 2020. FINDINGS: PA and lateral chest images demonstrate interval improvement in right  right basilar atelectasis/infiltrate with mild residual bibasilar  atelectasis/scarring. Elevation of the right hemidiaphragm is again noted in  association with interposition of the hepatic flexure between the liver and  hemidiaphragm. The heart size is within normal limits without evidence of  congestive heart failure or pneumothorax. The bony thorax appears intact on  these views. Impression  INTERVAL IMPROVEMENT IN RIGHT BASILAR ATELECTASIS/INFILTRATE WITH  MILD RESIDUAL BIBASILAR ATELECTASIS/SCAR ADJACENT TO THE MILDLY ELEVATED  HEMIDIAPHRAGM. CT Results (most recent):  Results from Hospital Encounter encounter on 05/06/21    CT CHEST WO CONT    Narrative  EXAMINATION: CT CHEST WITHOUT INTRAVENOUS CONTRAST 5/6/2021 9:15 AM    ACCESSION NUMBER: [id number]    INDICATION: Pneumonia due to COVID 19    COMPARISON: Chest CT 2/1/2021    TECHNIQUE: Multiple contiguous axial CT images of the chest were obtained from  the lung apices to the lung bases after without intravenous contrast.    Radiation dose reduction techniques were used for this study:  Our CT scanners  use one or all of the following: Automated exposure control, adjustment of the  mA and/or kVp according to patient's size, iterative reconstruction. FINDINGS:    THORACIC AORTA: Mild scattered atherosclerosis.     PROXIMAL GREAT VESSELS: Unremarkable    HEART: Left anterior descending coronary artery atherosclerosis. PERICARDIUM: Unremarkable    MEDIASTINAL LYMPH NODES: Unremarkable  HILAR LYMPH NODES: Unremarkable  AXILLARY LYMPH NODES: Unremarkable    PULMONARY PARENCHYMA: Minimal linear scarring in the lingula, unchanged from the  prior. 0.2 cm left lower lobe subpleural nodule (axial image 27). Voice calcifications along the base of the right middle lobe unchanged. Linear  opacities and reticulonodular densities in the periphery of the right upper lobe  and right middle lobe persists but are somewhat improved in comparison to the  prior. No new suspicious nodules or masses in the right lung. The large central airways are predominantly clear. PLEURA: No pneumothorax. No pleural effusion. VISUALIZED UPPER ABDOMEN: There is no free intraperitoneal gas in the included  portions of the upper abdomen. Cholelithiasis. OSSEOUS STRUCTURES: Thoracic spine spondylosis without suspicious lytic or  blastic bony lesions. Impression  1. Some improvement of ground glass opacities and reticular nodular densities in  the periphery of the right upper lobe and right middle lobe. The remaining areas  of abnormal pulmonary parenchyma may represent subacute to chronic sequelae of  the prior COVID 19 infection. To what extent these may improve in the future is  uncertain. 2. Unchanged linear scarring in the lingula. 3. New 0.2 cm left lower lobe subpleural nodule, favored to be inflammatory. VOICE DICTATED BY: Dr. Shabnam Pepe        Admission date (for inpatients): 9/7/2021   * No surgery found *  * No surgery found *    Plan:     70 y.o. male  with a history of COPD, SHYAM,prior tobacco abuse, prior R lobectomy,  chronic sinusitis/polyps, allergic rhinitis, HTN, and GERD.      Traumatic Laceration of left lower extremity, initial encounter  Patient with culture of wound with Doris Briceño; antibiotic coverage appropriate  Patient completed course of antibiotics  No need for further cultures  Patient's wound has now healed. Patient can continue compression with tubigrip; discussed obtaining compression dressing OTC to help prevent future wounds   Patient's wounds have completed healing, patient can follow up as needed  Patient with great pulses in feet, no swelling in LLE or RLE    Follow-up Information     Follow up With Specialties Details Why Contact Info    [address]  Discharge [address]  [phone]              No orders of the defined types were placed in this encounter. Return Appointment: Discharge with Dr Aknita Garcia     Instructions: Wound Healed-Discharge from [facility] [address] from [address]  [facility] to Area  Do Not Scrub New Skin     Should you experience increased redness, swelling, pain, foul odor, size of wound(s), or have a temperature over 101 degrees please contact the [address] [place] [address] Road at [phone] or if after hours contact your primary care physician or go to the hospital emergency department. Level of MDM (Based on 2/3 elements below)  Number and Complexity of Problems Addressed Amount and/or Complexity of Data to be Reviewed and Analyzed  *Each unique test, order, or document contributes to the combination of 2 or combination of 3 in Category 1 below.  Risk of Complications and/or Morbidity or Mortality of pt Management     81725  68874 SF Minimal  1self-limited or minor problem Minimal or none Minimal risk of morbidity from additional diagnostic testing or Rx   27794  17904 Low Low  2or more self-limited or minor problems;    or  1stable chronic illness;    or  [id number], uncomplicated illness or injury   Limited  (Must meet the requirements of at least 1 of the 2 categories)  Category 1: Tests and documents   Any combination of 2 from the following:  Review of prior external note(s) from each unique source*;  review of the result(s) of each unique test*;   ordering of each unique test*    or   Category 2: Assessment requiring an independent historian(s)  (For the categories of independent interpretation of tests and discussion of management or test interpretation, see moderate or high) Low risk of morbidity from additional diagnostic testing or treatment     49663  41455 Mod Moderate  1or more chronic illnesses with exacerbation, progression, or side effects of treatment;    or  2or more stable chronic illnesses;    or      1undiagnosed new problem with uncertain prognosis;    or  1acute illness with systemic symptoms;    or  0SIBAC complicated injury   Moderate:  (Must meet the requirements of 1 out of 3 categories)    Category 1: Tests, documents, or independent historian(s)  Any combination of 3 from the following:   Review of prior external note(s) from each unique source*;  Review of the result(s) of each unique test*;  Ordering of each unique test*;  Assessment requiring an independent historian(s)    or  Category 2: Independent interpretation of tests   Independent interpretation of a test performed by another physician/other qualified health care professional (not separately reported);     or  Category 3: Discussion of management or test interpretation  Discussion of management or test interpretation with external physician/other qualified health care professional/appropriate source (not separately reported)   Moderate risk of morbidity from additional diagnostic testing or treatment  Examples only:  Prescription drug management   Decision regarding minor surgery with identified patient or procedure risk factors  Decision regarding elective major surgery without identified patient or procedure risk factors   Diagnosis or treatment significantly limited by social determinants of health       78903  98818 High High  1or more chronic illnesses with severe exacerbation, progression, or side effects of treatment;    or  1 acute or chronic illness or injury that poses a threat to life or bodily function   Extensive  (Must meet the requirements of at least 2 out of 3 categories)    Category 1: Tests, documents, or independent historian(s)  Any combination of 3 from the following:   Review of prior external note(s) from each unique source*;  Review of the result(s) of each unique test*;   Ordering of each unique test*;   Assessment requiring an independent historian(s)    or   Category 2: Independent interpretation of tests   Independent interpretation of a test performed by another physician/other qualified health care professional (not separately reported);     or  Category 3: Discussion of management or test interpretation  Discussion of management or test interpretation with external physician/other qualified health care professional/appropriate source (not separately reported)   High risk of morbidity from additional diagnostic testing or treatment  Examples only:  Drug therapy requiring intensive monitoring for toxicity  Decision regarding elective major surgery with identified patient or procedure risk factors  Decision regarding emergency major surgery  Decision regarding hospitalization  Decision not to resuscitate or to de-escalate care because of poor prognosis             I have personally performed a face-to-face diagnostic evaluation and management  service on this patient. I have independently seen the patient. I have independently obtained the above history from the patient/family. I have independently examined the patient with above findings. I have independently reviewed data/labs for this patient and developed the above plan of care (MDM).   Electronically signed by Giovanni Siu DO on 9/7/2021 at 4:03 PM

## 2021-09-07 NOTE — DISCHARGE INSTRUCTIONS
[address]  [address], [address]   Phone: [phone]  Fax: [phone]    Patient: Elan Mcgee MRN: [id number]  SSN: [id number]    YOB: 1945  Age: 70 y.o. Sex: male       Return Appointment: Discharge with Dr Amanda Hurtado    Instructions: Wound Healed-Discharge from [facility] from [facility] to [facility]  Do Not Scrub New Skin    Should you experience increased redness, swelling, pain, foul odor, size of wound(s), or have a temperature over 101 degrees please contact the [address] [place] [address] Road at [phone] or if after hours contact your primary care physician or go to the hospital emergency department.     Signed By: Ambar Doss RN     September 7, 2021

## 2021-11-08 ENCOUNTER — HOSPITAL ENCOUNTER (OUTPATIENT)
Dept: CT IMAGING | Age: 76
Discharge: HOME | End: 2021-11-08
Attending: PHYSICIAN ASSISTANT
Payer: MEDICARE

## 2021-11-08 DIAGNOSIS — R91.8 GROUND GLASS OPACITY PRESENT ON IMAGING OF LUNG: ICD-10-CM

## 2021-11-08 DIAGNOSIS — U07.1 COVID-19: ICD-10-CM

## 2021-11-08 PROCEDURE — 71250 CT THORAX DX C-: CPT

## 2021-11-15 PROBLEM — K80.20 CALCULUS OF GALLBLADDER WITHOUT CHOLECYSTITIS WITHOUT OBSTRUCTION: Status: ACTIVE | Noted: 2021-11-15

## 2021-11-15 PROBLEM — S81.802A LEG WOUND, LEFT, INITIAL ENCOUNTER: Status: RESOLVED | Noted: 2021-08-10 | Resolved: 2021-11-15

## 2022-03-18 PROBLEM — J30.9 ALLERGIC RHINITIS: Status: ACTIVE | Noted: 2017-08-22

## 2022-03-18 PROBLEM — N40.0 BENIGN PROSTATIC HYPERPLASIA WITHOUT LOWER URINARY TRACT SYMPTOMS: Status: ACTIVE | Noted: 2021-06-25

## 2022-03-19 PROBLEM — Z86.16 HISTORY OF COVID-19: Status: ACTIVE | Noted: 2021-06-25

## 2022-03-19 PROBLEM — K80.20 CALCULUS OF GALLBLADDER WITHOUT CHOLECYSTITIS WITHOUT OBSTRUCTION: Status: ACTIVE | Noted: 2021-11-15

## 2022-03-20 PROBLEM — E66.3 OVERWEIGHT (BMI 25.0-29.9): Status: ACTIVE | Noted: 2018-06-19

## 2022-07-08 ENCOUNTER — COMMUNITY OUTREACH (OUTPATIENT)
Dept: INTERNAL MEDICINE CLINIC | Facility: CLINIC | Age: 77
End: 2022-07-08

## 2022-07-08 NOTE — PROGRESS NOTES
Patient's HM shows they are current for Colorectal Screening. [employer] and  files searched with success. Results attached to order and HM updated.

## 2022-07-28 DIAGNOSIS — R97.20 ELEVATED PSA: Primary | ICD-10-CM

## 2022-07-29 ENCOUNTER — NURSE ONLY (OUTPATIENT)
Dept: UROLOGY | Age: 77
End: 2022-07-29

## 2022-07-29 DIAGNOSIS — R97.20 ELEVATED PSA: ICD-10-CM

## 2022-07-29 LAB — PSA SERPL-MCNC: 7.7 NG/ML

## 2022-08-08 ENCOUNTER — OFFICE VISIT (OUTPATIENT)
Dept: UROLOGY | Age: 77
End: 2022-08-08
Payer: MEDICARE

## 2022-08-08 DIAGNOSIS — R97.20 ELEVATED PSA: Primary | ICD-10-CM

## 2022-08-08 LAB
BILIRUBIN, URINE, POC: NEGATIVE
BLOOD URINE, POC: NEGATIVE
GLUCOSE URINE, POC: NEGATIVE
KETONES, URINE, POC: NEGATIVE
LEUKOCYTE ESTERASE, URINE, POC: NEGATIVE
NITRITE, URINE, POC: NEGATIVE
PH, URINE, POC: 5.5 (ref 4.6–8)
PROTEIN,URINE, POC: NEGATIVE
SPECIFIC GRAVITY, URINE, POC: 1.02 (ref 1–1.03)
URINALYSIS CLARITY, POC: NORMAL
URINALYSIS COLOR, POC: NORMAL
UROBILINOGEN, POC: 0.2

## 2022-08-08 PROCEDURE — 99214 OFFICE O/P EST MOD 30 MIN: CPT | Performed by: UROLOGY

## 2022-08-08 PROCEDURE — G8427 DOCREV CUR MEDS BY ELIG CLIN: HCPCS | Performed by: UROLOGY

## 2022-08-08 PROCEDURE — G8419 CALC BMI OUT NRM PARAM NOF/U: HCPCS | Performed by: UROLOGY

## 2022-08-08 PROCEDURE — 1123F ACP DISCUSS/DSCN MKR DOCD: CPT | Performed by: UROLOGY

## 2022-08-08 PROCEDURE — 81003 URINALYSIS AUTO W/O SCOPE: CPT | Performed by: UROLOGY

## 2022-08-08 PROCEDURE — 4004F PT TOBACCO SCREEN RCVD TLK: CPT | Performed by: UROLOGY

## 2022-08-08 PROCEDURE — 3017F COLORECTAL CA SCREEN DOC REV: CPT | Performed by: UROLOGY

## 2022-08-08 RX ORDER — OMEPRAZOLE 20 MG/1
CAPSULE, DELAYED RELEASE ORAL
COMMUNITY
Start: 2022-07-10

## 2022-08-08 NOTE — PROGRESS NOTES
[facility]  [address] [facility] [address], [address] [facility]  [phone]          Mitcheal Mimes III  : 1945    No chief complaint on file. JOSUE Parsons is a 78 y.o. male followed previously by Dr. Ronald Guerra secondary to elevated psa and + family history of ACP. He has one negative prostate in the past with Dr. Peggy Donaldson. Patient's father was diagnosed with prostate cancer later in life. As long as he is in good health, he would like to continue yearly psa/michelle.        PSA:  2.9,  6.4, 10/16 3.6,  3.2,  3.1,  2.9,  3.3,  3.6, 22 7.7          Past Medical History:   Diagnosis Date    Asthma     Bronchitis     Calculus of gallbladder without cholecystitis without obstruction 11/15/2021    COPD (chronic obstructive pulmonary disease) ([facility] [address]     Depression     Elevated PSA 2016    GERD (gastroesophageal reflux disease)     Hypertension     Malignant melanoma of skin (HCC)     SARA (obstructive sleep apnea)     Personal history of colonic polyps     Prostatitis     Sinusitis, chronic      Past Surgical History:   Procedure Laterality Date    CATARACT REMOVAL Bilateral ,    HEENT      sinus surgery    LOBECTOMY      right lung    MALIGNANT SKIN LESION EXCISION      rt shoulder, forehead     RHINOPLASTY  2009    RHINOPLASTY  2018    TOTAL KNEE ARTHROPLASTY Left     TOTAL KNEE ARTHROPLASTY      right 2006 left      Current Outpatient Medications   Medication Sig Dispense Refill    Multiple Vitamin (MULTIVITAMIN ADULT PO) Take 1 tablet by mouth daily      omeprazole (PRILOSEC) 20 MG delayed release capsule TAKE 1 CAPSULE BY MOUTH EVERY DAY      albuterol sulfate  (90 Base) MCG/ACT inhaler Inhale into the lungs      amLODIPine (NORVASC) 10 MG tablet Take 10 mg by mouth daily      benralizumab (FASENRA) 30 MG/ML SOSY injection Inject 30 mg into the skin      budesonide-formoterol (SYMBICORT) 160-4.5 MCG/ACT AERO Inhale 2 puffs into the lungs 2 times daily      Cholecalciferol 50 MCG ( UT) TABS Take by mouth      fluticasone (FLONASE) 50 MCG/ACT nasal spray 2 puffs each nostril at bedtime      irbesartan (AVAPRO) 300 MG tablet TAKE 1 TABLET BY MOUTH EVERY NIGHT      montelukast (SINGULAIR) 10 MG tablet Take 10 mg by mouth daily      tamsulosin (FLOMAX) 0.4 MG capsule Take 0.4 mg by mouth daily       No current facility-administered medications for this visit.      Allergies   Allergen Reactions    Celecoxib Hives    Moxifloxacin Hives    Penicillins Other (See Comments)     Upset stomach     Social History     Socioeconomic History    Marital status:      Spouse name: Not on file    Number of children: Not on file    Years of education: Not on file    Highest education level: Not on file   Occupational History    Not on file   Tobacco Use    Smoking status: Former     Packs/day: 1.00     Types: Cigarettes     Quit date: 3/24/1975     Years since quittin.4    Smokeless tobacco: Never   Substance and Sexual Activity    Alcohol use: Yes    Drug use: No    Sexual activity: Not on file   Other Topics Concern    Not on file   Social History Narrative    Not on file     Social Determinants of Health     Financial Resource Strain: Not on file   Food Insecurity: Not on file   Transportation Needs: Not on file   Physical Activity: Not on file   Stress: Not on file   Social Connections: Not on file   Intimate Partner Violence: Not on file   Housing Stability: Not on file     Family History   Problem Relation Age of Onset    Colon Cancer Father     Prostate Cancer Father     Hypertension Mother        ROS    Urinalysis  UA - Dipstick  Results for orders placed or performed in visit on 22   AMB POC URINALYSIS DIP STICK AUTO W/O MICRO   Result Value Ref Range    Color, Urine, POC      Clarity, Urine, POC      Glucose, Urine, POC Negative Negative    Bilirubin, Urine, POC Negative Negative    Ketones, Urine, POC Negative Negative    Specific Gravity, Urine, POC 1.025 1.001 - 1.035    Blood, Urine, POC Negative Negative    pH, Urine, POC 5.5 4.6 - 8.0    Protein, Urine, POC Negative Negative    Urobilinogen, POC 0.2     Nitrate, Urine, POC Negative Negative    Leukocyte Esterase, Urine, POC Negative Negative       There were no vitals taken for this visit. GENERAL: NAD, ALERT, A&O x 3, GAIT NORMAL  LUNGS: easy work of breathing  ABDOMEN: soft, non tender  HEATHER: R>L, 2+ no nodule  NEUROLOGIC: cranial nerves 2-12 grossly intact           Assessment and Plan    ICD-10-CM    1. Elevated PSA  R97.20 AMB POC URINALYSIS DIP STICK AUTO W/O MICRO     PSA, Diagnostic          PSA is up. Options were discussed. He will return in 6-8 weeks with repeat psa. IF not under 7, he will be scheduled for TRUS guided prostate biopsy.

## 2022-10-06 ENCOUNTER — NURSE ONLY (OUTPATIENT)
Dept: UROLOGY | Age: 77
End: 2022-10-06

## 2022-10-06 DIAGNOSIS — R97.20 ELEVATED PSA: ICD-10-CM

## 2022-10-07 LAB — PSA SERPL-MCNC: 4.6 NG/ML

## 2022-10-13 ENCOUNTER — OFFICE VISIT (OUTPATIENT)
Dept: UROLOGY | Age: 77
End: 2022-10-13
Payer: MEDICARE

## 2022-10-13 DIAGNOSIS — N13.8 BENIGN PROSTATIC HYPERPLASIA WITH URINARY OBSTRUCTION: ICD-10-CM

## 2022-10-13 DIAGNOSIS — N40.1 BENIGN PROSTATIC HYPERPLASIA WITH URINARY OBSTRUCTION: ICD-10-CM

## 2022-10-13 DIAGNOSIS — R97.20 ELEVATED PSA: Primary | ICD-10-CM

## 2022-10-13 PROCEDURE — G8417 CALC BMI ABV UP PARAM F/U: HCPCS | Performed by: UROLOGY

## 2022-10-13 PROCEDURE — 1123F ACP DISCUSS/DSCN MKR DOCD: CPT | Performed by: UROLOGY

## 2022-10-13 PROCEDURE — 99214 OFFICE O/P EST MOD 30 MIN: CPT | Performed by: UROLOGY

## 2022-10-13 PROCEDURE — 3017F COLORECTAL CA SCREEN DOC REV: CPT | Performed by: UROLOGY

## 2022-10-13 PROCEDURE — G8427 DOCREV CUR MEDS BY ELIG CLIN: HCPCS | Performed by: UROLOGY

## 2022-10-13 PROCEDURE — 1036F TOBACCO NON-USER: CPT | Performed by: UROLOGY

## 2022-10-13 PROCEDURE — G8484 FLU IMMUNIZE NO ADMIN: HCPCS | Performed by: UROLOGY

## 2022-10-13 ASSESSMENT — ENCOUNTER SYMPTOMS: NAUSEA: 0

## 2022-10-13 NOTE — PROGRESS NOTES
[facility]  [address]    [place] [address], [address] [facility]  [phone]          Nasrin Mcdonnell III  : 1945    Chief Complaint   Patient presents with    Follow-up          HPI     Suresh Rodriguez is a 78 y.o. male followed previously by Dr. Paco CUMMINGS [facility] secondary to elevated psa and + family history of ACP. He has one negative prostate in the past with Dr. Tracey Arvizu. Patient's father was diagnosed with prostate cancer later in life. As long as he is in good health, he would like to continue yearly psa/michelle.        PSA:  2.9,  6.4, 10/16 3.6,  3.2,  3.1,  2.9,  3.3,  3.6, 22 7.7, 10/6/22 4.6          Past Medical History:   Diagnosis Date    Asthma     Bronchitis     Calculus of gallbladder without cholecystitis without obstruction 11/15/2021    COPD (chronic obstructive pulmonary disease) ([address]     Depression     Elevated PSA 2016    GERD (gastroesophageal reflux disease)     Hypertension     Malignant melanoma of skin (HCC)     SARA (obstructive sleep apnea)     Personal history of colonic polyps     Prostatitis     Sinusitis, chronic      Past Surgical History:   Procedure Laterality Date    CATARACT REMOVAL Bilateral ,    HEENT      sinus surgery    LOBECTOMY      right lung    MALIGNANT SKIN LESION EXCISION      rt shoulder, forehead     RHINOPLASTY  2009    RHINOPLASTY  2018    TOTAL KNEE ARTHROPLASTY Left     TOTAL KNEE ARTHROPLASTY      right 2006 left      Current Outpatient Medications   Medication Sig Dispense Refill    Multiple Vitamin (MULTIVITAMIN ADULT PO) Take 1 tablet by mouth daily      omeprazole (PRILOSEC) 20 MG delayed release capsule TAKE 1 CAPSULE BY MOUTH EVERY DAY      albuterol sulfate  (90 Base) MCG/ACT inhaler Inhale into the lungs      amLODIPine (NORVASC) 10 MG tablet Take 10 mg by mouth daily      benralizumab (FASENRA) 30 MG/ML SOSY injection Inject 30 mg into the skin budesonide-formoterol (SYMBICORT) 160-4.5 MCG/ACT AERO Inhale 2 puffs into the lungs 2 times daily      Cholecalciferol 50 MCG (2000 UT) TABS Take by mouth      fluticasone (FLONASE) 50 MCG/ACT nasal spray 2 puffs each nostril at bedtime      irbesartan (AVAPRO) 300 MG tablet TAKE 1 TABLET BY MOUTH EVERY NIGHT      montelukast (SINGULAIR) 10 MG tablet Take 10 mg by mouth daily      tamsulosin (FLOMAX) 0.4 MG capsule Take 0.4 mg by mouth daily       No current facility-administered medications for this visit. Allergies   Allergen Reactions    Celecoxib Hives    Moxifloxacin Hives    Penicillins Other (See Comments)     Upset stomach     Social History     Socioeconomic History    Marital status:      Spouse name: Not on file    Number of children: Not on file    Years of education: Not on file    Highest education level: Not on file   Occupational History    Not on file   Tobacco Use    Smoking status: Former     Packs/day: 1.00     Types: Cigarettes     Quit date: 3/24/1975     Years since quittin.5    Smokeless tobacco: Never   Substance and Sexual Activity    Alcohol use: Yes    Drug use: No    Sexual activity: Not on file   Other Topics Concern    Not on file   Social History Narrative    Not on file     Social Determinants of Health     Financial Resource Strain: Not on file   Food Insecurity: Not on file   Transportation Needs: Not on file   Physical Activity: Not on file   Stress: Not on file   Social Connections: Not on file   Intimate Partner Violence: Not on file   Housing Stability: Not on file     Family History   Problem Relation Age of Onset    Colon Cancer Father     Prostate Cancer Father     Hypertension Mother        Review of Systems  Constitutional:   Negative for fever. GI:  Negative for nausea. Genitourinary:  Negative for flank pain.     Urinalysis  UA - Dipstick  Results for orders placed or performed in visit on 22   AMB POC URINALYSIS DIP STICK AUTO W/O MICRO   Result Value Ref Range    Color, Urine, POC      Clarity, Urine, POC      Glucose, Urine, POC Negative Negative    Bilirubin, Urine, POC Negative Negative    Ketones, Urine, POC Negative Negative    Specific Gravity, Urine, POC 1.025 1.001 - 1.035    Blood, Urine, POC Negative Negative    pH, Urine, POC 5.5 4.6 - 8.0    Protein, Urine, POC Negative Negative    Urobilinogen, POC 0.2     Nitrate, Urine, POC Negative Negative    Leukocyte Esterase, Urine, POC Negative Negative       There were no vitals taken for this visit. GENERAL: NAD, ALERT, A&O x 3, GAIT NORMAL  LUNGS: easy work of breathing  ABDOMEN: soft, non tender  NEUROLOGIC: cranial nerves 2-12 grossly intact           Assessment and Plan    ICD-10-CM    1. Elevated PSA  R97.20 PSA, Diagnostic      2. Benign prostatic hyperplasia with urinary obstruction  N40.1     N13.8         We waited 6 weeks and psa dropped as we had hoped. Options were discussed. Pt. will follow up in 6 months with psa for HEATHER. If in the 3's, we could get back to q 12 mo visits. If above 7, we could consider bx or MRI.

## 2022-10-20 ENCOUNTER — OFFICE VISIT (OUTPATIENT)
Dept: INTERNAL MEDICINE CLINIC | Facility: CLINIC | Age: 77
End: 2022-10-20
Payer: MEDICARE

## 2022-10-20 VITALS
OXYGEN SATURATION: 96 % | HEIGHT: 72 IN | DIASTOLIC BLOOD PRESSURE: 70 MMHG | WEIGHT: 207 LBS | SYSTOLIC BLOOD PRESSURE: 110 MMHG | HEART RATE: 88 BPM | BODY MASS INDEX: 28.04 KG/M2

## 2022-10-20 DIAGNOSIS — R09.81 NASAL CONGESTION: Primary | ICD-10-CM

## 2022-10-20 DIAGNOSIS — R09.82 POSTNASAL DRIP: ICD-10-CM

## 2022-10-20 DIAGNOSIS — J02.9 PHARYNGITIS, UNSPECIFIED ETIOLOGY: ICD-10-CM

## 2022-10-20 PROCEDURE — 1036F TOBACCO NON-USER: CPT | Performed by: NURSE PRACTITIONER

## 2022-10-20 PROCEDURE — G8427 DOCREV CUR MEDS BY ELIG CLIN: HCPCS | Performed by: NURSE PRACTITIONER

## 2022-10-20 PROCEDURE — G8484 FLU IMMUNIZE NO ADMIN: HCPCS | Performed by: NURSE PRACTITIONER

## 2022-10-20 PROCEDURE — 3017F COLORECTAL CA SCREEN DOC REV: CPT | Performed by: NURSE PRACTITIONER

## 2022-10-20 PROCEDURE — 1123F ACP DISCUSS/DSCN MKR DOCD: CPT | Performed by: NURSE PRACTITIONER

## 2022-10-20 PROCEDURE — 99213 OFFICE O/P EST LOW 20 MIN: CPT | Performed by: NURSE PRACTITIONER

## 2022-10-20 PROCEDURE — G8417 CALC BMI ABV UP PARAM F/U: HCPCS | Performed by: NURSE PRACTITIONER

## 2022-10-20 RX ORDER — METHYLPREDNISOLONE 4 MG/1
TABLET ORAL
Qty: 1 KIT | Refills: 0 | Status: SHIPPED | OUTPATIENT
Start: 2022-10-20 | End: 2022-10-26

## 2022-10-20 ASSESSMENT — ENCOUNTER SYMPTOMS
ABDOMINAL PAIN: 0
NAUSEA: 0
COUGH: 0
SWOLLEN GLANDS: 0
CHANGE IN BOWEL HABIT: 0
SINUS COMPLAINT: 1
SORE THROAT: 1
VOMITING: 0

## 2022-10-20 ASSESSMENT — PATIENT HEALTH QUESTIONNAIRE - PHQ9
SUM OF ALL RESPONSES TO PHQ9 QUESTIONS 1 & 2: 0
1. LITTLE INTEREST OR PLEASURE IN DOING THINGS: 0
SUM OF ALL RESPONSES TO PHQ QUESTIONS 1-9: 0
2. FEELING DOWN, DEPRESSED OR HOPELESS: 0
SUM OF ALL RESPONSES TO PHQ QUESTIONS 1-9: 0

## 2022-10-20 NOTE — PROGRESS NOTES
Nakia Greene III (:  1945) is a 78 y.o. male,Established patient, here for evaluation of the following chief complaint(s):  Pharyngitis (Swelling in throat swallowing is challenging feels swollen drainage //) and Sinus Problem ([facility] never completely went away /)         ASSESSMENT/PLAN:  1. Nasal congestion  2. Pharyngitis, unspecified etiology  3. Postnasal drip    No follow-ups on file. Patient with nasal congestion and post-nasal drip  Recommend routine use of flonase during the season changes  Take daily antihistamine as well  May do well with a short steroid pack  Discussed med risks/side effects, he has taken in the past  F/u if worsening or no improvement    Subjective   SUBJECTIVE/OBJECTIVE:  Patient is here for congestion and throat drainage x 1 month. He was treated with doxycycline from urgent care. That cleared up a lot of the green mucous but he still has fullness in ears and drainage in throat. No chest issues, all head and throat congestion. No fever. He hasn't been taking his flonase consistently    Pharyngitis  The current episode started 1 to 4 weeks ago. The problem has been gradually improving. Associated symptoms include congestion and a sore throat. Pertinent negatives include no abdominal pain, anorexia, arthralgias, change in bowel habit, chest pain, chills, coughing, diaphoresis, fatigue, fever, headaches, nausea, rash, swollen glands, urinary symptoms, vertigo or vomiting. Sinus Problem  Associated symptoms include congestion and a sore throat. Pertinent negatives include no chills, coughing, diaphoresis, headaches or swollen glands. Review of Systems   Constitutional:  Negative for chills, diaphoresis, fatigue and fever. HENT:  Positive for congestion and sore throat. Respiratory:  Negative for cough. Cardiovascular:  Negative for chest pain.    Gastrointestinal:  Negative for abdominal pain, anorexia, change in bowel habit, nausea and vomiting. Musculoskeletal:  Negative for arthralgias. Skin:  Negative for rash. Neurological:  Negative for vertigo and headaches. Objective   Physical Exam  Constitutional:       Appearance: Normal appearance. He is not ill-appearing. HENT:      Head: Normocephalic. Right Ear: External ear normal. A middle ear effusion is present. Left Ear: External ear normal. A middle ear effusion is present. Nose: Congestion present. Right Turbinates: Swollen. Left Turbinates: Swollen. Mouth/Throat:      Mouth: Mucous membranes are moist.      Pharynx: Posterior oropharyngeal erythema present. No oropharyngeal exudate. Comments: Visible post nasal drip    Eyes:      Extraocular Movements: Extraocular movements intact. Pupils: Pupils are equal, round, and reactive to light. Cardiovascular:      Rate and Rhythm: Normal rate and regular rhythm. Pulmonary:      Effort: Pulmonary effort is normal.      Breath sounds: Normal breath sounds. Musculoskeletal:      Cervical back: Neck supple. Skin:     General: Skin is warm and dry. Neurological:      General: No focal deficit present. Mental Status: He is alert and oriented to person, place, and time. Psychiatric:         Mood and Affect: Mood normal.         Behavior: Behavior normal.                An electronic signature was used to authenticate this note.     --JERMAIN Anders - CNP

## 2022-11-16 ENCOUNTER — OFFICE VISIT (OUTPATIENT)
Dept: INTERNAL MEDICINE CLINIC | Facility: CLINIC | Age: 77
End: 2022-11-16
Payer: MEDICARE

## 2022-11-16 VITALS
OXYGEN SATURATION: 97 % | BODY MASS INDEX: 27.9 KG/M2 | SYSTOLIC BLOOD PRESSURE: 130 MMHG | HEIGHT: 72 IN | RESPIRATION RATE: 17 BRPM | HEART RATE: 90 BPM | DIASTOLIC BLOOD PRESSURE: 72 MMHG | WEIGHT: 206 LBS

## 2022-11-16 DIAGNOSIS — Z00.00 MEDICARE ANNUAL WELLNESS VISIT, SUBSEQUENT: ICD-10-CM

## 2022-11-16 DIAGNOSIS — N40.1 BENIGN PROSTATIC HYPERPLASIA WITH WEAK URINARY STREAM: Primary | ICD-10-CM

## 2022-11-16 DIAGNOSIS — I10 HTN (HYPERTENSION), BENIGN: ICD-10-CM

## 2022-11-16 DIAGNOSIS — J45.20 MILD INTERMITTENT ASTHMA WITHOUT COMPLICATION: ICD-10-CM

## 2022-11-16 DIAGNOSIS — M26.623 BILATERAL TEMPOROMANDIBULAR JOINT PAIN: ICD-10-CM

## 2022-11-16 DIAGNOSIS — R39.12 BENIGN PROSTATIC HYPERPLASIA WITH WEAK URINARY STREAM: Primary | ICD-10-CM

## 2022-11-16 LAB
ERYTHROCYTE [DISTWIDTH] IN BLOOD BY AUTOMATED COUNT: 12.4 % (ref 11.9–14.6)
HCT VFR BLD AUTO: 46.1 % (ref 41.1–50.3)
HGB BLD-MCNC: 15.2 G/DL (ref 13.6–17.2)
MCH RBC QN AUTO: 31.5 PG (ref 26.1–32.9)
MCHC RBC AUTO-ENTMCNC: 33 G/DL (ref 31.4–35)
MCV RBC AUTO: 95.4 FL (ref 82–102)
NRBC # BLD: 0 K/UL (ref 0–0.2)
PLATELET # BLD AUTO: 247 K/UL (ref 150–450)
PMV BLD AUTO: 10 FL (ref 9.4–12.3)
RBC # BLD AUTO: 4.83 M/UL (ref 4.23–5.6)
WBC # BLD AUTO: 4.2 K/UL (ref 4.3–11.1)

## 2022-11-16 PROCEDURE — 99214 OFFICE O/P EST MOD 30 MIN: CPT | Performed by: FAMILY MEDICINE

## 2022-11-16 PROCEDURE — 1123F ACP DISCUSS/DSCN MKR DOCD: CPT | Performed by: FAMILY MEDICINE

## 2022-11-16 PROCEDURE — G8484 FLU IMMUNIZE NO ADMIN: HCPCS | Performed by: FAMILY MEDICINE

## 2022-11-16 PROCEDURE — G8417 CALC BMI ABV UP PARAM F/U: HCPCS | Performed by: FAMILY MEDICINE

## 2022-11-16 PROCEDURE — 1036F TOBACCO NON-USER: CPT | Performed by: FAMILY MEDICINE

## 2022-11-16 PROCEDURE — 3017F COLORECTAL CA SCREEN DOC REV: CPT | Performed by: FAMILY MEDICINE

## 2022-11-16 PROCEDURE — 3074F SYST BP LT 130 MM HG: CPT | Performed by: FAMILY MEDICINE

## 2022-11-16 PROCEDURE — 3078F DIAST BP <80 MM HG: CPT | Performed by: FAMILY MEDICINE

## 2022-11-16 PROCEDURE — G8427 DOCREV CUR MEDS BY ELIG CLIN: HCPCS | Performed by: FAMILY MEDICINE

## 2022-11-16 PROCEDURE — G0439 PPPS, SUBSEQ VISIT: HCPCS | Performed by: FAMILY MEDICINE

## 2022-11-16 RX ORDER — IRBESARTAN 300 MG/1
300 TABLET ORAL DAILY
Qty: 90 TABLET | Refills: 3 | Status: SHIPPED | OUTPATIENT
Start: 2022-11-16

## 2022-11-16 RX ORDER — TAMSULOSIN HYDROCHLORIDE 0.4 MG/1
0.8 CAPSULE ORAL DAILY
Qty: 180 CAPSULE | Refills: 3 | Status: SHIPPED | OUTPATIENT
Start: 2022-11-16

## 2022-11-16 RX ORDER — AMLODIPINE BESYLATE 10 MG/1
10 TABLET ORAL DAILY
Qty: 90 TABLET | Refills: 3 | Status: SHIPPED | OUTPATIENT
Start: 2022-11-16

## 2022-11-16 ASSESSMENT — PATIENT HEALTH QUESTIONNAIRE - PHQ9
SUM OF ALL RESPONSES TO PHQ QUESTIONS 1-9: 0
2. FEELING DOWN, DEPRESSED OR HOPELESS: 0
SUM OF ALL RESPONSES TO PHQ9 QUESTIONS 1 & 2: 0
SUM OF ALL RESPONSES TO PHQ QUESTIONS 1-9: 0
SUM OF ALL RESPONSES TO PHQ QUESTIONS 1-9: 0
1. LITTLE INTEREST OR PLEASURE IN DOING THINGS: 0
SUM OF ALL RESPONSES TO PHQ QUESTIONS 1-9: 0

## 2022-11-16 ASSESSMENT — LIFESTYLE VARIABLES: HOW MANY STANDARD DRINKS CONTAINING ALCOHOL DO YOU HAVE ON A TYPICAL DAY: PATIENT DOES NOT DRINK

## 2022-11-16 NOTE — PROGRESS NOTES
Jerardo Wong DO  Diplomate of the [employer] of [address]      Judson Dotson III (: 1945) is a 78 y.o. male, here for evaluation of the following chief complaint(s):  Medicare AWV       ASSESSMENT/PLAN:  1. Benign prostatic hyperplasia with weak urinary stream  -     tamsulosin (FLOMAX) 0.4 MG capsule; Take 2 capsules by mouth daily, Disp-180 capsule, R-3Normal  2. Bilateral temporomandibular joint pain  3. HTN (hypertension), benign  -     amLODIPine (NORVASC) 10 MG tablet; Take 1 tablet by mouth daily, Disp-90 tablet, R-3Normal  -     irbesartan (AVAPRO) 300 MG tablet; Take 1 tablet by mouth daily, Disp-90 tablet, R-3Normal  -     Basic Metabolic Panel; Future  -     Hepatic Function Panel; Future  -     Lipid Panel; Future  -     CBC; Future  4. Mild intermittent asthma without complication  5. Medicare annual wellness visit, subsequent     -Will increase tamsulosin to 0.8mg daily. Discussed potential adverse effects.  -Follow up with Urology for PSA. -Trial of Aleve in the evening for TMJ, if no better, follow up with dentist/ENT.  -Tolerating medication well for HTN. Achieving desired therapeutic response with   Key Anti-Hypertensive Meds            amLODIPine (NORVASC) 10 MG tablet (Taking)    Sig - Route: Take 1 tablet by mouth daily - Oral    irbesartan (AVAPRO) 300 MG tablet (Taking)    Sig - Route: Take 1 tablet by mouth daily - Oral         --will continue. Will periodically review and adjust if needed. Encourage home monitoring.   -Continued treatment with Sascha Overton and follow up with specialist as scheduled. SUBJECTIVE/OBJECTIVE:  HPI:  -Patient has been noticing that he has been having some decreased urinary stream in the mornings. He states initially Flomax has made a difference but is noticed over the past few months that stream has started to slow in the mornings.   He denies any dysuria or hematuria associated with this.  -Has been having some bilateral what is described as temporomandibular joint soreness and pressure as well. Initially thought it was due to his ears. But does seem to be more localized to this joint. -HTN: taking medications as instructed, no medication side effects noted. He denies chest pain, palpitations, exertional pain or pressure.  -Asthma has been well controlled on Fasenra---no significant exacerbations. ROS negative except as noted above today. Social History     Tobacco Use    Smoking status: Former     Packs/day: 1.00     Types: Cigarettes     Quit date: 3/24/1975     Years since quittin.6    Smokeless tobacco: Never   Substance Use Topics    Alcohol use: Yes    Drug use: No     Vitals:    22 0821   BP: 130/72   Site: Left Upper Arm   Position: Sitting   Pulse: 90   Resp: 17   SpO2: 97%   Weight: 206 lb (93.4 kg)   Height: 6' (1.829 m)      Body mass index is 27.94 kg/m². Physical Exam  Vitals reviewed. Constitutional:       General: He is not in acute distress. Appearance: He is not ill-appearing. HENT:      Head: Normocephalic. Jaw: There is normal jaw occlusion. Tenderness present. Comments: -Mild TMJ tenderness     Right Ear: Tympanic membrane, ear canal and external ear normal.      Left Ear: Tympanic membrane, ear canal and external ear normal.   Eyes:      Extraocular Movements: Extraocular movements intact. Pupils: Pupils are equal, round, and reactive to light. Cardiovascular:      Rate and Rhythm: Normal rate and regular rhythm. Heart sounds: Normal heart sounds. Pulmonary:      Effort: Pulmonary effort is normal.      Breath sounds: Normal breath sounds. Abdominal:      General: Abdomen is flat. Palpations: Abdomen is soft. Tenderness: There is no abdominal tenderness. Musculoskeletal:         General: Normal range of motion. Cervical back: Neck supple. Skin:     General: Skin is warm and dry.    Neurological:      General: No focal deficit present. Mental Status: He is alert. An electronic signature was used to authenticate this note. Darrian Hernandez, DO     Medicare Annual Wellness Visit    Raymundo Simeon III is here for Linda PULIDO    Assessment & Plan   Medicare annual wellness visit, subsequent    Recommendations for Preventive Services Due: see orders and patient instructions/AVS.  Recommended screening schedule for the next 5-10 years is provided to the patient in written form: see Patient Instructions/AVS.     Return for Medicare Annual Wellness Visit in 1 year. Patient's complete Health Risk Assessment and screening values have been reviewed and are found in Flowsheets. The following problems were reviewed today and where indicated follow up appointments were made and/or referrals ordered.     Positive Risk Factor Screenings with Interventions:             General Health and ACP:  General  In general, how would you say your health is?: Excellent  In the past 7 days, have you experienced any of the following: New or Increased Pain, New or Increased Fatigue, Loneliness, Social Isolation, Stress or Anger?: No  Do you get the social and emotional support that you need?: Yes  Do you have a Living Will?: Yes    Advance Directives       Power of  Living Will ACP-Advance Directive ACP-Power of Harinder Garcia on 03/18/21 Not on File Not on File Filed          Safety:  Do you have working smoke detectors?: Yes  Do you have any tripping hazards - loose or unsecured carpets or rugs?: No  Do you have any tripping hazards - clutter in doorways, halls, or stairs?: No  Do you have either shower bars, grab bars, non-slip mats or non-slip surfaces in your shower or bathtub?: (!) No  Do all of your stairways have a railing or banister?: Yes  Do you always fasten your seatbelt when you are in a car?: Yes  Safety Interventions:  Safety interventions as appropriate           Objective   Vitals:    11/16/22 0821   BP: 130/72   Site: Left Upper Arm   Position: Sitting   Pulse: 90   Resp: 17   SpO2: 97%   Weight: 206 lb (93.4 kg)   Height: 6' (1.829 m)      Body mass index is 27.94 kg/m². Allergies   Allergen Reactions    Celecoxib Hives    Moxifloxacin Hives    Penicillins Other (See Comments)     Upset stomach     Prior to Visit Medications    Medication Sig Taking?  Authorizing Provider   amLODIPine (NORVASC) 10 MG tablet Take 1 tablet by mouth daily Yes Winda Days T Brothers, DO   irbesartan (AVAPRO) 300 MG tablet Take 1 tablet by mouth daily Yes Winda Days T Brothers, DO   tamsulosin (FLOMAX) 0.4 MG capsule Take 2 capsules by mouth daily Yes Sonia  Brothers, DO   Multiple Vitamin (MULTIVITAMIN ADULT PO) Take 1 tablet by mouth daily Yes Historical Provider, MD   omeprazole (PRILOSEC) 20 MG delayed release capsule TAKE 1 CAPSULE BY MOUTH EVERY DAY Yes Historical Provider, MD   albuterol sulfate  (90 Base) MCG/ACT inhaler Inhale into the lungs Yes Ar Automatic Reconciliation   benralizumab (FASENRA) 30 MG/ML SOSY injection Inject 30 mg into the skin Yes Ar Automatic Reconciliation   budesonide-formoterol (SYMBICORT) 160-4.5 MCG/ACT AERO Inhale 2 puffs into the lungs 2 times daily Yes Ar Automatic Reconciliation   Cholecalciferol 50 MCG (2000 UT) TABS Take by mouth Yes Ar Automatic Reconciliation   fluticasone (FLONASE) 50 MCG/ACT nasal spray 2 puffs each nostril at bedtime Yes Ar Automatic Reconciliation   montelukast (SINGULAIR) 10 MG tablet Take 10 mg by mouth daily Yes Ar Automatic Reconciliation       CareTeam (Including outside providers/suppliers regularly involved in providing care):   Patient Care Team:  Stanley Casillas DO as PCP - General  Stanley Casillas DO as PCP - [facility] Empaneled Provider     Reviewed and updated this visit:  Tobacco  Allergies  Meds  Problems  Med Hx  Surg Hx  Soc Hx  Fam Hx

## 2022-11-16 NOTE — PATIENT INSTRUCTIONS
Personalized Preventive Plan for Bessy Carlos III - 11/16/2022  Medicare offers a range of preventive health benefits. Some of the tests and screenings are paid in full while other may be subject to a deductible, co-insurance, and/or copay. Some of these benefits include a comprehensive review of your medical history including lifestyle, illnesses that may run in your family, and various assessments and screenings as appropriate. After reviewing your medical record and screening and assessments performed today your provider may have ordered immunizations, labs, imaging, and/or referrals for you. A list of these orders (if applicable) as well as your Preventive Care list are included within your After Visit Summary for your review. Other Preventive Recommendations:    A preventive eye exam performed by an eye specialist is recommended every 1-2 years to screen for glaucoma; cataracts, macular degeneration, and other eye disorders. A preventive dental visit is recommended every 6 months. Try to get at least 150 minutes of exercise per week or 10,000 steps per day on a pedometer . Order or download the FREE \"Exercise & Physical Activity: Your Everyday Guide\" from [employer]. Call 8-805.869.1530 or search [employer] online. You need 9928-3357 mg of calcium and 9007-2392 IU of vitamin D per day. It is possible to meet your calcium requirement with diet alone, but a vitamin D supplement is usually necessary to meet this goal.  When exposed to the sun, use a sunscreen that protects against both UVA and UVB radiation with an SPF of 30 or greater. Reapply every 2 to 3 hours or after sweating, drying off with a towel, or swimming. Always wear a seat belt when traveling in a car. Always wear a helmet when riding a bicycle or motorcycle.

## 2022-11-17 ENCOUNTER — PATIENT MESSAGE (OUTPATIENT)
Dept: INTERNAL MEDICINE CLINIC | Facility: CLINIC | Age: 77
End: 2022-11-17

## 2022-11-17 LAB
ALBUMIN SERPL-MCNC: 4.1 G/DL (ref 3.2–4.6)
ALBUMIN/GLOB SERPL: 1.5 {RATIO} (ref 0.4–1.6)
ALP SERPL-CCNC: 80 U/L (ref 50–136)
ALT SERPL-CCNC: 28 U/L (ref 12–65)
ANION GAP SERPL CALC-SCNC: 5 MMOL/L (ref 2–11)
AST SERPL-CCNC: 13 U/L (ref 15–37)
BILIRUB DIRECT SERPL-MCNC: 0.2 MG/DL
BILIRUB SERPL-MCNC: 0.7 MG/DL (ref 0.2–1.1)
BUN SERPL-MCNC: 15 MG/DL (ref 8–23)
CALCIUM SERPL-MCNC: 9.3 MG/DL (ref 8.3–10.4)
CHLORIDE SERPL-SCNC: 106 MMOL/L (ref 101–110)
CHOLEST SERPL-MCNC: 159 MG/DL
CO2 SERPL-SCNC: 30 MMOL/L (ref 21–32)
CREAT SERPL-MCNC: 0.8 MG/DL (ref 0.8–1.5)
GLOBULIN SER CALC-MCNC: 2.8 G/DL (ref 2.8–4.5)
GLUCOSE SERPL-MCNC: 103 MG/DL (ref 65–100)
HDLC SERPL-MCNC: 54 MG/DL (ref 40–60)
HDLC SERPL: 2.9 {RATIO}
LDLC SERPL CALC-MCNC: 88.4 MG/DL
POTASSIUM SERPL-SCNC: 4.1 MMOL/L (ref 3.5–5.1)
PROT SERPL-MCNC: 6.9 G/DL (ref 6.3–8.2)
SODIUM SERPL-SCNC: 141 MMOL/L (ref 133–143)
TRIGL SERPL-MCNC: 83 MG/DL (ref 35–150)
VLDLC SERPL CALC-MCNC: 16.6 MG/DL (ref 6–23)

## 2022-11-18 NOTE — TELEPHONE ENCOUNTER
From: Kelsy Sandoval III  To: Dr. Justo Barone  Sent: 11/17/2022 7:10 AM EST  Subject: Labs    Do I need to be concerned about my Glucose? I notice it has been trending higher lately and is outside the normal range the last two test. Thanks!

## 2022-11-21 DIAGNOSIS — R79.89 ABNORMAL CBC: Primary | ICD-10-CM

## 2022-12-16 ENCOUNTER — NURSE ONLY (OUTPATIENT)
Dept: INTERNAL MEDICINE CLINIC | Facility: CLINIC | Age: 77
End: 2022-12-16

## 2022-12-16 DIAGNOSIS — R79.89 ABNORMAL CBC: ICD-10-CM

## 2022-12-16 LAB
ERYTHROCYTE [DISTWIDTH] IN BLOOD BY AUTOMATED COUNT: 12.2 % (ref 11.9–14.6)
HCT VFR BLD AUTO: 44.1 % (ref 41.1–50.3)
HGB BLD-MCNC: 14.4 G/DL (ref 13.6–17.2)
MCH RBC QN AUTO: 31.1 PG (ref 26.1–32.9)
MCHC RBC AUTO-ENTMCNC: 32.7 G/DL (ref 31.4–35)
MCV RBC AUTO: 95.2 FL (ref 82–102)
NRBC # BLD: 0 K/UL (ref 0–0.2)
PLATELET # BLD AUTO: 236 K/UL (ref 150–450)
PMV BLD AUTO: 9.8 FL (ref 9.4–12.3)
RBC # BLD AUTO: 4.63 M/UL (ref 4.23–5.6)
WBC # BLD AUTO: 5.8 K/UL (ref 4.3–11.1)

## 2023-01-10 DIAGNOSIS — I10 HTN (HYPERTENSION), BENIGN: ICD-10-CM

## 2023-01-10 RX ORDER — AMLODIPINE BESYLATE 10 MG/1
10 TABLET ORAL DAILY
Qty: 90 TABLET | Refills: 3 | Status: SHIPPED | OUTPATIENT
Start: 2023-01-10

## 2023-04-06 DIAGNOSIS — N13.8 BENIGN PROSTATIC HYPERPLASIA WITH URINARY OBSTRUCTION: ICD-10-CM

## 2023-04-06 DIAGNOSIS — R97.20 ELEVATED PSA: Primary | ICD-10-CM

## 2023-04-06 DIAGNOSIS — N40.1 BENIGN PROSTATIC HYPERPLASIA WITH URINARY OBSTRUCTION: ICD-10-CM

## 2023-10-09 ENCOUNTER — NURSE ONLY (OUTPATIENT)
Dept: UROLOGY | Age: 78
End: 2023-10-09

## 2023-10-09 DIAGNOSIS — R97.20 ELEVATED PSA: ICD-10-CM

## 2023-10-09 LAB — PSA SERPL-MCNC: 5.2 NG/ML

## 2023-10-16 ENCOUNTER — OFFICE VISIT (OUTPATIENT)
Dept: UROLOGY | Age: 78
End: 2023-10-16
Payer: MEDICARE

## 2023-10-16 DIAGNOSIS — R97.20 ELEVATED PSA: Primary | ICD-10-CM

## 2023-10-16 DIAGNOSIS — N13.8 BENIGN PROSTATIC HYPERPLASIA WITH URINARY OBSTRUCTION: ICD-10-CM

## 2023-10-16 DIAGNOSIS — N40.1 BENIGN PROSTATIC HYPERPLASIA WITH URINARY OBSTRUCTION: ICD-10-CM

## 2023-10-16 LAB
BILIRUBIN, URINE, POC: NEGATIVE
BLOOD URINE, POC: NORMAL
GLUCOSE URINE, POC: NEGATIVE
KETONES, URINE, POC: NEGATIVE
LEUKOCYTE ESTERASE, URINE, POC: NEGATIVE
NITRITE, URINE, POC: NEGATIVE
PH, URINE, POC: 5.5 (ref 4.6–8)
PROTEIN,URINE, POC: NEGATIVE
SPECIFIC GRAVITY, URINE, POC: 1 (ref 1–1.03)
URINALYSIS CLARITY, POC: NORMAL
URINALYSIS COLOR, POC: NORMAL
UROBILINOGEN, POC: NORMAL

## 2023-10-16 PROCEDURE — 1123F ACP DISCUSS/DSCN MKR DOCD: CPT | Performed by: UROLOGY

## 2023-10-16 PROCEDURE — G8427 DOCREV CUR MEDS BY ELIG CLIN: HCPCS | Performed by: UROLOGY

## 2023-10-16 PROCEDURE — 99213 OFFICE O/P EST LOW 20 MIN: CPT | Performed by: UROLOGY

## 2023-10-16 PROCEDURE — 1036F TOBACCO NON-USER: CPT | Performed by: UROLOGY

## 2023-10-16 PROCEDURE — 81003 URINALYSIS AUTO W/O SCOPE: CPT | Performed by: UROLOGY

## 2023-10-16 PROCEDURE — G8484 FLU IMMUNIZE NO ADMIN: HCPCS | Performed by: UROLOGY

## 2023-10-16 PROCEDURE — G8417 CALC BMI ABV UP PARAM F/U: HCPCS | Performed by: UROLOGY

## 2023-10-16 ASSESSMENT — ENCOUNTER SYMPTOMS: BACK PAIN: 0

## 2023-10-16 NOTE — PROGRESS NOTES
N40. 1 AMB POC URINALYSIS DIP STICK AUTO W/O MICRO    N13.8           He will continue tamsulosin from Dr. Lauren Fuentes. Pt. will follow up in 12 months with psa for HEATHER. I have spent 20 minutes today reviewing previous notes, test results and face to face with the patient as well as documenting.

## 2023-11-14 SDOH — ECONOMIC STABILITY: TRANSPORTATION INSECURITY
IN THE PAST 12 MONTHS, HAS LACK OF TRANSPORTATION KEPT YOU FROM MEETINGS, WORK, OR FROM GETTING THINGS NEEDED FOR DAILY LIVING?: NO

## 2023-11-14 SDOH — ECONOMIC STABILITY: HOUSING INSECURITY
IN THE LAST 12 MONTHS, WAS THERE A TIME WHEN YOU DID NOT HAVE A STEADY PLACE TO SLEEP OR SLEPT IN A SHELTER (INCLUDING NOW)?: NO

## 2023-11-14 SDOH — ECONOMIC STABILITY: INCOME INSECURITY: HOW HARD IS IT FOR YOU TO PAY FOR THE VERY BASICS LIKE FOOD, HOUSING, MEDICAL CARE, AND HEATING?: NOT HARD AT ALL

## 2023-11-14 SDOH — ECONOMIC STABILITY: FOOD INSECURITY: WITHIN THE PAST 12 MONTHS, THE FOOD YOU BOUGHT JUST DIDN'T LAST AND YOU DIDN'T HAVE MONEY TO GET MORE.: NEVER TRUE

## 2023-11-14 SDOH — ECONOMIC STABILITY: FOOD INSECURITY: WITHIN THE PAST 12 MONTHS, YOU WORRIED THAT YOUR FOOD WOULD RUN OUT BEFORE YOU GOT MONEY TO BUY MORE.: NEVER TRUE

## 2023-11-14 ASSESSMENT — PATIENT HEALTH QUESTIONNAIRE - PHQ9
SUM OF ALL RESPONSES TO PHQ QUESTIONS 1-9: 0
SUM OF ALL RESPONSES TO PHQ9 QUESTIONS 1 & 2: 0
2. FEELING DOWN, DEPRESSED OR HOPELESS: NOT AT ALL
SUM OF ALL RESPONSES TO PHQ QUESTIONS 1-9: 0
SUM OF ALL RESPONSES TO PHQ QUESTIONS 1-9: 0
1. LITTLE INTEREST OR PLEASURE IN DOING THINGS: NOT AT ALL
2. FEELING DOWN, DEPRESSED OR HOPELESS: 0
SUM OF ALL RESPONSES TO PHQ QUESTIONS 1-9: 0
1. LITTLE INTEREST OR PLEASURE IN DOING THINGS: 0
SUM OF ALL RESPONSES TO PHQ9 QUESTIONS 1 & 2: 0

## 2023-11-17 ENCOUNTER — OFFICE VISIT (OUTPATIENT)
Dept: INTERNAL MEDICINE CLINIC | Facility: CLINIC | Age: 78
End: 2023-11-17

## 2023-11-17 VITALS
OXYGEN SATURATION: 98 % | RESPIRATION RATE: 17 BRPM | DIASTOLIC BLOOD PRESSURE: 80 MMHG | HEIGHT: 72 IN | HEART RATE: 90 BPM | SYSTOLIC BLOOD PRESSURE: 136 MMHG | BODY MASS INDEX: 28.71 KG/M2 | WEIGHT: 212 LBS

## 2023-11-17 DIAGNOSIS — Z00.00 MEDICARE ANNUAL WELLNESS VISIT, SUBSEQUENT: Primary | ICD-10-CM

## 2023-11-17 DIAGNOSIS — N40.1 BENIGN PROSTATIC HYPERPLASIA WITH WEAK URINARY STREAM: ICD-10-CM

## 2023-11-17 DIAGNOSIS — R97.20 ELEVATED PSA: ICD-10-CM

## 2023-11-17 DIAGNOSIS — I10 HTN (HYPERTENSION), BENIGN: ICD-10-CM

## 2023-11-17 DIAGNOSIS — R39.12 BENIGN PROSTATIC HYPERPLASIA WITH WEAK URINARY STREAM: ICD-10-CM

## 2023-11-17 LAB
ALBUMIN SERPL-MCNC: 3.9 G/DL (ref 3.2–4.6)
ALBUMIN/GLOB SERPL: 1.2 (ref 0.4–1.6)
ALP SERPL-CCNC: 94 U/L (ref 50–136)
ALT SERPL-CCNC: 20 U/L (ref 12–65)
ANION GAP SERPL CALC-SCNC: 7 MMOL/L (ref 2–11)
AST SERPL-CCNC: 11 U/L (ref 15–37)
BILIRUB DIRECT SERPL-MCNC: 0.1 MG/DL
BILIRUB SERPL-MCNC: 0.4 MG/DL (ref 0.2–1.1)
BUN SERPL-MCNC: 27 MG/DL (ref 8–23)
CALCIUM SERPL-MCNC: 9.4 MG/DL (ref 8.3–10.4)
CHLORIDE SERPL-SCNC: 110 MMOL/L (ref 101–110)
CHOLEST SERPL-MCNC: 146 MG/DL
CO2 SERPL-SCNC: 27 MMOL/L (ref 21–32)
CREAT SERPL-MCNC: 0.9 MG/DL (ref 0.8–1.5)
ERYTHROCYTE [DISTWIDTH] IN BLOOD BY AUTOMATED COUNT: 12.1 % (ref 11.9–14.6)
GLOBULIN SER CALC-MCNC: 3.2 G/DL (ref 2.8–4.5)
GLUCOSE SERPL-MCNC: 115 MG/DL (ref 65–100)
HCT VFR BLD AUTO: 45 % (ref 41.1–50.3)
HDLC SERPL-MCNC: 49 MG/DL (ref 40–60)
HDLC SERPL: 3
HGB BLD-MCNC: 14.5 G/DL (ref 13.6–17.2)
LDLC SERPL CALC-MCNC: 85 MG/DL
MCH RBC QN AUTO: 30.1 PG (ref 26.1–32.9)
MCHC RBC AUTO-ENTMCNC: 32.2 G/DL (ref 31.4–35)
MCV RBC AUTO: 93.6 FL (ref 82–102)
NRBC # BLD: 0 K/UL (ref 0–0.2)
PLATELET # BLD AUTO: 339 K/UL (ref 150–450)
PMV BLD AUTO: 9.6 FL (ref 9.4–12.3)
POTASSIUM SERPL-SCNC: 4.4 MMOL/L (ref 3.5–5.1)
PROT SERPL-MCNC: 7.1 G/DL (ref 6.3–8.2)
RBC # BLD AUTO: 4.81 M/UL (ref 4.23–5.6)
SODIUM SERPL-SCNC: 144 MMOL/L (ref 133–143)
TRIGL SERPL-MCNC: 60 MG/DL (ref 35–150)
VLDLC SERPL CALC-MCNC: 12 MG/DL (ref 6–23)
WBC # BLD AUTO: 4.9 K/UL (ref 4.3–11.1)

## 2023-11-17 RX ORDER — TAMSULOSIN HYDROCHLORIDE 0.4 MG/1
0.8 CAPSULE ORAL DAILY
Qty: 180 CAPSULE | Refills: 3 | Status: SHIPPED | OUTPATIENT
Start: 2023-11-17

## 2023-11-17 RX ORDER — IRBESARTAN 300 MG/1
300 TABLET ORAL DAILY
Qty: 90 TABLET | Refills: 3 | Status: SHIPPED | OUTPATIENT
Start: 2023-11-17

## 2023-11-17 RX ORDER — AMLODIPINE BESYLATE 10 MG/1
10 TABLET ORAL DAILY
Qty: 90 TABLET | Refills: 3 | Status: SHIPPED | OUTPATIENT
Start: 2023-11-17

## 2023-11-17 ASSESSMENT — LIFESTYLE VARIABLES
HOW OFTEN DURING THE LAST YEAR HAVE YOU FOUND THAT YOU WERE NOT ABLE TO STOP DRINKING ONCE YOU HAD STARTED: 0
HOW OFTEN DURING THE LAST YEAR HAVE YOU NEEDED AN ALCOHOLIC DRINK FIRST THING IN THE MORNING TO GET YOURSELF GOING AFTER A NIGHT OF HEAVY DRINKING: 0
HOW OFTEN DURING THE LAST YEAR HAVE YOU BEEN UNABLE TO REMEMBER WHAT HAPPENED THE NIGHT BEFORE BECAUSE YOU HAD BEEN DRINKING: 0
HOW MANY STANDARD DRINKS CONTAINING ALCOHOL DO YOU HAVE ON A TYPICAL DAY: 1 OR 2
HOW OFTEN DO YOU HAVE A DRINK CONTAINING ALCOHOL: 4 OR MORE TIMES A WEEK
HAS A RELATIVE, FRIEND, DOCTOR, OR ANOTHER HEALTH PROFESSIONAL EXPRESSED CONCERN ABOUT YOUR DRINKING OR SUGGESTED YOU CUT DOWN: 0
HAVE YOU OR SOMEONE ELSE BEEN INJURED AS A RESULT OF YOUR DRINKING: 0
HOW OFTEN DURING THE LAST YEAR HAVE YOU FAILED TO DO WHAT WAS NORMALLY EXPECTED FROM YOU BECAUSE OF DRINKING: 0
HOW OFTEN DURING THE LAST YEAR HAVE YOU HAD A FEELING OF GUILT OR REMORSE AFTER DRINKING: 0

## 2023-11-17 NOTE — PROGRESS NOTES
Medicare Annual Wellness Visit    José Antonio Pimentel III is here for Medicare AWV    Assessment & Plan   Medicare annual wellness visit, subsequent  HTN (hypertension), benign  -     amLODIPine (NORVASC) 10 MG tablet; Take 1 tablet by mouth daily, Disp-90 tablet, R-3Normal  -     irbesartan (AVAPRO) 300 MG tablet; Take 1 tablet by mouth daily, Disp-90 tablet, R-3Normal  -     Basic Metabolic Panel; Future  -     Hepatic Function Panel; Future  -     Lipid Panel; Future  -     CBC; Future  Benign prostatic hyperplasia with weak urinary stream  -     tamsulosin (FLOMAX) 0.4 MG capsule; Take 2 capsules by mouth daily, Disp-180 capsule, R-3Normal  Elevated PSA        Tolerating medication well for HTN. Achieving desired therapeutic response with   Key Anti-Hypertensive Meds            amLODIPine (NORVASC) 10 MG tablet (Taking)    Sig - Route: Take 1 tablet by mouth daily - Oral    irbesartan (AVAPRO) 300 MG tablet (Taking)    Sig - Route: Take 1 tablet by mouth daily - Oral         --will continue. Will periodically review and adjust if needed. Encourage home monitoring. Will continue with Flomax as getting good clinical benefit. Continue to follow up with Urology as scheduled. Recommendations for Preventive Services Due: see orders and patient instructions/AVS.  Recommended screening schedule for the next 5-10 years is provided to the patient in written form: see Patient Instructions/AVS.     Return in about 1 year (around 11/17/2024), or if symptoms worsen or fail to improve. Subjective   The following acute and/or chronic problems were also addressed today:  HTN: Home BP Monitoring: no. taking medications as instructed, no medication side effects noted. He denies chest pain, palpitations, exertional pain or pressure. BPH well controlled on current medication. No dysuria or hematuria. ROS negative except as noted above today.   Patient's complete Health Risk Assessment and screening values have been

## 2023-11-20 ENCOUNTER — TELEPHONE (OUTPATIENT)
Dept: INTERNAL MEDICINE CLINIC | Facility: CLINIC | Age: 78
End: 2023-11-20

## 2023-11-20 DIAGNOSIS — R73.09 ELEVATED GLUCOSE: Primary | ICD-10-CM

## 2023-11-21 ENCOUNTER — NURSE ONLY (OUTPATIENT)
Dept: INTERNAL MEDICINE CLINIC | Facility: CLINIC | Age: 78
End: 2023-11-21

## 2023-11-21 DIAGNOSIS — R73.09 ELEVATED GLUCOSE: ICD-10-CM

## 2023-11-22 LAB
EST. AVERAGE GLUCOSE BLD GHB EST-MCNC: 111 MG/DL
HBA1C MFR BLD: 5.5 % (ref 4.8–5.6)

## 2024-04-01 ENCOUNTER — OFFICE VISIT (OUTPATIENT)
Dept: INTERNAL MEDICINE CLINIC | Facility: CLINIC | Age: 79
End: 2024-04-01
Payer: MEDICARE

## 2024-04-01 VITALS
DIASTOLIC BLOOD PRESSURE: 90 MMHG | HEIGHT: 72 IN | BODY MASS INDEX: 29.66 KG/M2 | WEIGHT: 219 LBS | HEART RATE: 84 BPM | TEMPERATURE: 99 F | OXYGEN SATURATION: 97 % | SYSTOLIC BLOOD PRESSURE: 138 MMHG

## 2024-04-01 DIAGNOSIS — M54.50 LEFT LOW BACK PAIN, UNSPECIFIED CHRONICITY, UNSPECIFIED WHETHER SCIATICA PRESENT: Primary | ICD-10-CM

## 2024-04-01 LAB
BILIRUBIN, URINE, POC: NEGATIVE
BLOOD URINE, POC: NEGATIVE
GLUCOSE URINE, POC: NEGATIVE
KETONES, URINE, POC: NEGATIVE
LEUKOCYTE ESTERASE, URINE, POC: NEGATIVE
NITRITE, URINE, POC: NEGATIVE
PH, URINE, POC: 6 (ref 4.6–8)
PROTEIN,URINE, POC: NEGATIVE
SPECIFIC GRAVITY, URINE, POC: 1.02 (ref 1–1.03)
URINALYSIS CLARITY, POC: CLEAR
URINALYSIS COLOR, POC: YELLOW
UROBILINOGEN, POC: NORMAL

## 2024-04-01 PROCEDURE — 1036F TOBACCO NON-USER: CPT | Performed by: NURSE PRACTITIONER

## 2024-04-01 PROCEDURE — 81002 URINALYSIS NONAUTO W/O SCOPE: CPT | Performed by: NURSE PRACTITIONER

## 2024-04-01 PROCEDURE — 3075F SYST BP GE 130 - 139MM HG: CPT | Performed by: NURSE PRACTITIONER

## 2024-04-01 PROCEDURE — 1123F ACP DISCUSS/DSCN MKR DOCD: CPT | Performed by: NURSE PRACTITIONER

## 2024-04-01 PROCEDURE — G8427 DOCREV CUR MEDS BY ELIG CLIN: HCPCS | Performed by: NURSE PRACTITIONER

## 2024-04-01 PROCEDURE — 99213 OFFICE O/P EST LOW 20 MIN: CPT | Performed by: NURSE PRACTITIONER

## 2024-04-01 PROCEDURE — G8417 CALC BMI ABV UP PARAM F/U: HCPCS | Performed by: NURSE PRACTITIONER

## 2024-04-01 PROCEDURE — 3080F DIAST BP >= 90 MM HG: CPT | Performed by: NURSE PRACTITIONER

## 2024-04-01 RX ORDER — PREDNISONE 10 MG/1
TABLET ORAL
Qty: 1 EACH | Refills: 0 | Status: SHIPPED | OUTPATIENT
Start: 2024-04-01

## 2024-04-01 ASSESSMENT — ENCOUNTER SYMPTOMS
ABDOMINAL PAIN: 0
BACK PAIN: 1
BOWEL INCONTINENCE: 0

## 2024-04-01 ASSESSMENT — PATIENT HEALTH QUESTIONNAIRE - PHQ9
SUM OF ALL RESPONSES TO PHQ QUESTIONS 1-9: 0
SUM OF ALL RESPONSES TO PHQ QUESTIONS 1-9: 0
SUM OF ALL RESPONSES TO PHQ9 QUESTIONS 1 & 2: 0
SUM OF ALL RESPONSES TO PHQ QUESTIONS 1-9: 0
2. FEELING DOWN, DEPRESSED OR HOPELESS: NOT AT ALL
1. LITTLE INTEREST OR PLEASURE IN DOING THINGS: NOT AT ALL
SUM OF ALL RESPONSES TO PHQ QUESTIONS 1-9: 0

## 2024-04-01 NOTE — PROGRESS NOTES
Chirag Hong III (:  1945) is a 78 y.o. male,Established patient, here for evaluation of the following chief complaint(s):  Back Pain (Lower left/)         ASSESSMENT/PLAN:  1. Left low back pain, unspecified chronicity, unspecified whether sciatica present  -     AMB POC URINALYSIS DIP STICK MANUAL W/O MICRO  -     XR LUMBAR SPINE (2-3 VIEWS); Future  -     [facility] - [facility] Therapy, [facility]      No follow-ups on file.     Back pain, worse in the morning, sharp in the morning  Get imaging  Start steroid for inflammation  Refer to PT  Discussed med risks/side effects  F/u if no improvement      Subjective   SUBJECTIVE/OBJECTIVE:  Patient is here for lower back pain in the morning. This started months ago. He gets a sharp driving pain that is brief. It is sharp and terrible and then over in the morning. Otherwise a low level pain, eases up in the day.   He thinks it is a kidney stone because years ago he had one on scan.   He feels the low level pain all the time but the terrible pain in the morning is sharp and over.   Wt Readings from Last 3 Encounters:  24 : 99.3 kg (219 lb)  23 : 96.2 kg (212 lb)  22 : 93.4 kg (206 lb)    Pain seems to be worsening, it is staying with him longer but it is a morning event - very sore when he gets out of bed. The only time he gets the terrible sharp pain in the morning.         Back Pain  This is a chronic problem. The current episode started more than 1 month ago. The pain is present in the lumbar spine. The quality of the pain is described as aching, shooting and stabbing. The pain does not radiate. The pain is severe. Exacerbated by: can feel the sharp pain coming- could be walking or standing, he can feel it coming. Pertinent negatives include no abdominal pain, bladder incontinence, bowel incontinence, dysuria, fever, leg pain, numbness, paresis, paresthesias, pelvic pain, perianal numbness, tingling or weakness. Risk

## 2024-04-02 DIAGNOSIS — M54.50 LEFT LOW BACK PAIN, UNSPECIFIED CHRONICITY, UNSPECIFIED WHETHER SCIATICA PRESENT: ICD-10-CM

## 2024-04-02 DIAGNOSIS — M51.36 DEGENERATIVE DISC DISEASE, LUMBAR: ICD-10-CM

## 2024-04-02 DIAGNOSIS — M43.16 ANTEROLISTHESIS OF LUMBAR SPINE: Primary | ICD-10-CM

## 2024-04-09 ENCOUNTER — HOSPITAL ENCOUNTER (OUTPATIENT)
Dept: CT IMAGING | Age: 79
Discharge: HOME | End: 2024-04-12
Payer: MEDICARE

## 2024-04-09 DIAGNOSIS — M43.16 ANTEROLISTHESIS OF LUMBAR SPINE: ICD-10-CM

## 2024-04-09 DIAGNOSIS — M51.36 DEGENERATIVE DISC DISEASE, LUMBAR: ICD-10-CM

## 2024-04-09 DIAGNOSIS — M54.50 LEFT LOW BACK PAIN, UNSPECIFIED CHRONICITY, UNSPECIFIED WHETHER SCIATICA PRESENT: ICD-10-CM

## 2024-04-09 PROCEDURE — 72131 CT LUMBAR SPINE W/O DYE: CPT

## 2024-04-11 ENCOUNTER — HOSPITAL ENCOUNTER (OUTPATIENT)
Dept: PHYSICAL THERAPY | Age: 79
Setting detail: RECURRING SERIES
Discharge: HOME | End: 2024-04-14
Payer: MEDICARE

## 2024-04-11 DIAGNOSIS — M25.652 STIFFNESS OF HIP JOINT, LEFT: ICD-10-CM

## 2024-04-11 DIAGNOSIS — M54.59 OTHER LOW BACK PAIN: Primary | ICD-10-CM

## 2024-04-11 DIAGNOSIS — M25.651 STIFFNESS OF HIP JOINT, RIGHT: ICD-10-CM

## 2024-04-11 DIAGNOSIS — N20.0 LEFT NEPHROLITHIASIS: Primary | ICD-10-CM

## 2024-04-11 DIAGNOSIS — M25.552 LEFT HIP PAIN: ICD-10-CM

## 2024-04-11 PROCEDURE — 97161 PT EVAL LOW COMPLEX 20 MIN: CPT

## 2024-04-11 PROCEDURE — 97110 THERAPEUTIC EXERCISES: CPT

## 2024-04-11 ASSESSMENT — PAIN SCALES - GENERAL: PAINLEVEL_OUTOF10: 0

## 2024-04-11 NOTE — PROGRESS NOTES
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   Initial Pain Level:: 0/10  Post Session Pain Level:  0/10  Medications Last Reviewed:  4/11/2024  Updated Objective Findings:  See Evaluation Note from today    Observation, Palpation, and Special Tests   Flat lumbar spine  Mod paraspinal tightness  Tender L PSIS, with mild up slip  Tight/tender L piriformis  L post innominate with long sitting       ROM   Trunk WNL but discomfort with ext on L  Tight hams, piriformis L > R with decreased L hip ER L > R      Functional Mobility, Balance, and Gait   Stairs - reciprocal with hand rail    SLS - 9 sec R, 7 sec L   Sit to stand - UE assist, rigid trunk     Outcome Measure:   Tool Used: Modified Oswestry Low Back Pain Questionnaire  Score:  Initial: 3/50   4/11/24 Most Recent: X/50 (Date: -- )   Interpretation of Score: Each section is scored on a 0-5 scale, 5 representing the greatest disability.  The scores of each section are added together for a total score of 50.   Treatment   THERAPEUTIC ACTIVITY: ( see below for minutes):  Therapeutic activities per grid below to improve mobility.  Required moderate verbal and manual cues to improve functional mobility .  THERAPEUTIC EXERCISE: (see below for minutes):  Exercises per grid below to improve strength.  Required moderate verbal and manual cues to promote proper body alignment, promote proper body posture, promote proper body mechanics and promote proper body breathing techniques.  Progressed resistance, range, repetitions and complexity of movement as indicated.  MANUAL THERAPY: (see below for minutes): Joint mobilization and Soft tissue mobilization was utilized and necessary because of the patient's restricted joint motion, painful spasm, loss of articular motion and restricted motion of soft tissue.   MODALITIES: (see below for minutes):      for pain modulation    AQUATIC THERAPY (see below for minutes):

## 2024-04-16 ENCOUNTER — HOSPITAL ENCOUNTER (OUTPATIENT)
Dept: PHYSICAL THERAPY | Age: 79
Setting detail: RECURRING SERIES
Discharge: HOME | End: 2024-04-19
Payer: MEDICARE

## 2024-04-16 PROCEDURE — 97140 MANUAL THERAPY 1/> REGIONS: CPT

## 2024-04-16 PROCEDURE — 97110 THERAPEUTIC EXERCISES: CPT

## 2024-04-16 NOTE — PROGRESS NOTES
Chirag Hong III  : 1945  Primary: Medicare Part A And B (Medicare)  Secondary: ANTHMANUEL SUPA [facility] [place]  [place] [address]  Phone: [phone]  Fax: [phone] Plan Frequency: 2 times/week    Plan of Care/Certification Expiration Date: 06/10/24        Plan of Care/Certification Expiration Date:  Plan of Care/Certification Expiration Date: 06/10/24    Frequency/Duration:   Plan Frequency: 2 times/week      Time In/Out:   Time In: 845  Time Out: 928      PT Visit Info:         Visit Count:  2    OUTPATIENT PHYSICAL THERAPY:   Treatment Note 2024       Episode  (L LBP)               Treatment Diagnosis:    Other low back pain  Left hip pain  Stiffness of hip joint, left  Stiffness of hip joint, right    Goals: (Goals have been discussed and agreed upon with patient.)  Short-Term Functional Goals: Time Frame: 4 weeks  Pt to report compliance with HEP  Pt to present with resolution of SIJ dysfunction  Pt to increase strength for sit to stand x 30 reps without UE assist  Discharge Goals: Time Frame: 8 weeks  Pt to increase SLS > 20 sec B for safe amb all surfaces  Pt to report resolution of sharp pain in the mornings  Pt to report min to no aching in L lower back    Medical/Referring Diagnosis:    Left low back pain, unspecified chronicity, unspecified whether sciatica present [M54.50]    Referring Physician:  Millie June, JERMAIN - CNP  MD Orders:  PT Eval and Treat   Return MD Appt:  none   Date of Onset:  Onset Date: 01/15/24     Allergies:   Celecoxib, Moxifloxacin, and Penicillins  Restrictions/Precautions:   None      Interventions Planned (Treatment may consist of any combination of the following):     Balance Training, Functional Mobility Training, Home Exercise Program (HEP), Manual Therapy, Pain Management, Range of Motion (ROM), Therapeutic Exercise/Strengthening, and Aquatic Therapy     Subjective Comments: I did do my

## 2024-04-18 ENCOUNTER — HOSPITAL ENCOUNTER (OUTPATIENT)
Dept: PHYSICAL THERAPY | Age: 79
Setting detail: RECURRING SERIES
Discharge: HOME | End: 2024-04-21
Payer: MEDICARE

## 2024-04-18 PROCEDURE — 97140 MANUAL THERAPY 1/> REGIONS: CPT

## 2024-04-18 PROCEDURE — 97110 THERAPEUTIC EXERCISES: CPT

## 2024-04-18 NOTE — PROGRESS NOTES
Chirag Hong III  : 1945  Primary: Medicare Part A And B (Medicare)  Secondary: ANTHMANUEL SUPA [facility] [place]  [place] [address]  Phone: [phone]  Fax: [phone] Plan Frequency: 2 times/week    Plan of Care/Certification Expiration Date: 06/10/24        Plan of Care/Certification Expiration Date:  Plan of Care/Certification Expiration Date: 06/10/24    Frequency/Duration:   Plan Frequency: 2 times/week      Time In/Out:   Time In: 845  Time Out: 923      PT Visit Info:         Visit Count:  3    OUTPATIENT PHYSICAL THERAPY:   Treatment Note 2024       Episode  (L LBP)               Treatment Diagnosis:    Other low back pain  Left hip pain  Stiffness of hip joint, left  Stiffness of hip joint, right    Goals: (Goals have been discussed and agreed upon with patient.)  Short-Term Functional Goals: Time Frame: 4 weeks  Pt to report compliance with HEP  Pt to present with resolution of SIJ dysfunction  Pt to increase strength for sit to stand x 30 reps without UE assist  Discharge Goals: Time Frame: 8 weeks  Pt to increase SLS > 20 sec B for safe amb all surfaces  Pt to report resolution of sharp pain in the mornings  Pt to report min to no aching in L lower back    Medical/Referring Diagnosis:    Left low back pain, unspecified chronicity, unspecified whether sciatica present [M54.50]    Referring Physician:  Millie June, JERMAIN - CNP  MD Orders:  PT Eval and Treat   Return MD Appt:  none   Date of Onset:  Onset Date: 01/15/24     Allergies:   Celecoxib, Moxifloxacin, and Penicillins  Restrictions/Precautions:   None      Interventions Planned (Treatment may consist of any combination of the following):     Balance Training, Functional Mobility Training, Home Exercise Program (HEP), Manual Therapy, Pain Management, Range of Motion (ROM), Therapeutic Exercise/Strengthening, and Aquatic Therapy     Subjective Comments: It is much

## 2024-04-23 ENCOUNTER — HOSPITAL ENCOUNTER (OUTPATIENT)
Dept: PHYSICAL THERAPY | Age: 79
Setting detail: RECURRING SERIES
Discharge: HOME | End: 2024-04-26
Payer: MEDICARE

## 2024-04-23 PROCEDURE — 97110 THERAPEUTIC EXERCISES: CPT

## 2024-04-23 PROCEDURE — 97140 MANUAL THERAPY 1/> REGIONS: CPT

## 2024-04-23 NOTE — PROGRESS NOTES
Chirag Hong III  : 1945  Primary: Medicare Part A And B (Medicare)  Secondary: ANTHMANUEL SUPA [facility] [place]  [place] [address]  Phone: [phone]  Fax: [phone] Plan Frequency: 2 times/week    Plan of Care/Certification Expiration Date: 06/10/24        Plan of Care/Certification Expiration Date:  Plan of Care/Certification Expiration Date: 06/10/24    Frequency/Duration:   Plan Frequency: 2 times/week      Time In/Out:   Time In: 0900  Time Out: 09      PT Visit Info:         Visit Count:  4    OUTPATIENT PHYSICAL THERAPY:   Treatment Note 2024       Episode  (L LBP)               Treatment Diagnosis:    Other low back pain  Left hip pain  Stiffness of hip joint, left  Stiffness of hip joint, right    Goals: (Goals have been discussed and agreed upon with patient.)  Short-Term Functional Goals: Time Frame: 4 weeks  Pt to report compliance with HEP  Pt to present with resolution of SIJ dysfunction  Pt to increase strength for sit to stand x 30 reps without UE assist  Discharge Goals: Time Frame: 8 weeks  Pt to increase SLS > 20 sec B for safe amb all surfaces  Pt to report resolution of sharp pain in the mornings  Pt to report min to no aching in L lower back    Medical/Referring Diagnosis:    Left low back pain, unspecified chronicity, unspecified whether sciatica present [M54.50]    Referring Physician:  Millie June, JERMAIN - CNP  MD Orders:  PT Eval and Treat   Return MD Appt:  none   Date of Onset:  Onset Date: 01/15/24     Allergies:   Celecoxib, Moxifloxacin, and Penicillins  Restrictions/Precautions:   None      Interventions Planned (Treatment may consist of any combination of the following):     Balance Training, Functional Mobility Training, Home Exercise Program (HEP), Manual Therapy, Pain Management, Range of Motion (ROM), Therapeutic Exercise/Strengthening, and Aquatic Therapy     Subjective Comments: I thought I

## 2024-04-25 ENCOUNTER — HOSPITAL ENCOUNTER (OUTPATIENT)
Dept: PHYSICAL THERAPY | Age: 79
Setting detail: RECURRING SERIES
Discharge: HOME | End: 2024-04-28
Payer: MEDICARE

## 2024-04-25 PROCEDURE — 97140 MANUAL THERAPY 1/> REGIONS: CPT

## 2024-04-25 PROCEDURE — 97110 THERAPEUTIC EXERCISES: CPT

## 2024-04-25 NOTE — PROGRESS NOTES
Chirag Hong III  : 1945  Primary: Medicare Part A And B (Medicare)  Secondary: ANTHMANUEL SUPA [facility] [place]  [place] [address]  Phone: [phone]  Fax: [phone] Plan Frequency: 2 times/week    Plan of Care/Certification Expiration Date: 06/10/24        Plan of Care/Certification Expiration Date:  Plan of Care/Certification Expiration Date: 06/10/24    Frequency/Duration:   Plan Frequency: 2 times/week      Time In/Out:   Time In: 0800  Time Out: 0855      PT Visit Info:         Visit Count:  5    OUTPATIENT PHYSICAL THERAPY:   Treatment Note 2024       Episode  (L LBP)               Treatment Diagnosis:    Other low back pain  Left hip pain  Stiffness of hip joint, left  Stiffness of hip joint, right    Goals: (Goals have been discussed and agreed upon with patient.)  Short-Term Functional Goals: Time Frame: 4 weeks  Pt to report compliance with HEP  Pt to present with resolution of SIJ dysfunction  Pt to increase strength for sit to stand x 30 reps without UE assist  Discharge Goals: Time Frame: 8 weeks  Pt to increase SLS > 20 sec B for safe amb all surfaces  Pt to report resolution of sharp pain in the mornings  Pt to report min to no aching in L lower back    Medical/Referring Diagnosis:    Left low back pain, unspecified chronicity, unspecified whether sciatica present [M54.50]    Referring Physician:  Millie June, JERMAIN - CNP  MD Orders:  PT Eval and Treat   Return MD Appt:  none   Date of Onset:  Onset Date: 01/15/24     Allergies:   Celecoxib, Moxifloxacin, and Penicillins  Restrictions/Precautions:   None      Interventions Planned (Treatment may consist of any combination of the following):     Balance Training, Functional Mobility Training, Home Exercise Program (HEP), Manual Therapy, Pain Management, Range of Motion (ROM), Therapeutic Exercise/Strengthening, and Aquatic Therapy     Subjective Comments:   I still

## 2024-04-30 ENCOUNTER — HOSPITAL ENCOUNTER (OUTPATIENT)
Dept: PHYSICAL THERAPY | Age: 79
Setting detail: RECURRING SERIES
Discharge: HOME | End: 2024-05-03
Payer: MEDICARE

## 2024-04-30 PROCEDURE — 97110 THERAPEUTIC EXERCISES: CPT

## 2024-04-30 PROCEDURE — 97140 MANUAL THERAPY 1/> REGIONS: CPT

## 2024-04-30 NOTE — DISCHARGE SUMMARY
8:30 AM [id number] BLOOD DRAW [id number] GVL AMB   10/17/2024  3:45 PM Arturo Mahajan MD [id number] [facility] AMB   11/22/2024  8:30 AM Leonides Wood, DO LUGO [facility] AMB

## 2024-05-03 ASSESSMENT — PATIENT HEALTH QUESTIONNAIRE - PHQ9
2. FEELING DOWN, DEPRESSED OR HOPELESS: NOT AT ALL
SUM OF ALL RESPONSES TO PHQ9 QUESTIONS 1 & 2: 0
2. FEELING DOWN, DEPRESSED OR HOPELESS: NOT AT ALL
SUM OF ALL RESPONSES TO PHQ QUESTIONS 1-9: 0
SUM OF ALL RESPONSES TO PHQ9 QUESTIONS 1 & 2: 0
1. LITTLE INTEREST OR PLEASURE IN DOING THINGS: NOT AT ALL
SUM OF ALL RESPONSES TO PHQ QUESTIONS 1-9: 0
1. LITTLE INTEREST OR PLEASURE IN DOING THINGS: NOT AT ALL

## 2024-05-06 ENCOUNTER — OFFICE VISIT (OUTPATIENT)
Dept: INTERNAL MEDICINE CLINIC | Facility: CLINIC | Age: 79
End: 2024-05-06
Payer: MEDICARE

## 2024-05-06 VITALS
DIASTOLIC BLOOD PRESSURE: 80 MMHG | HEIGHT: 72 IN | SYSTOLIC BLOOD PRESSURE: 138 MMHG | HEART RATE: 84 BPM | WEIGHT: 217 LBS | OXYGEN SATURATION: 98 % | BODY MASS INDEX: 29.39 KG/M2

## 2024-05-06 DIAGNOSIS — I10 HTN (HYPERTENSION), BENIGN: Primary | ICD-10-CM

## 2024-05-06 DIAGNOSIS — N20.0 NEPHROLITHIASIS: ICD-10-CM

## 2024-05-06 DIAGNOSIS — I70.90 ATHEROSCLEROSIS: ICD-10-CM

## 2024-05-06 PROCEDURE — 3075F SYST BP GE 130 - 139MM HG: CPT | Performed by: FAMILY MEDICINE

## 2024-05-06 PROCEDURE — 1123F ACP DISCUSS/DSCN MKR DOCD: CPT | Performed by: FAMILY MEDICINE

## 2024-05-06 PROCEDURE — 99213 OFFICE O/P EST LOW 20 MIN: CPT | Performed by: FAMILY MEDICINE

## 2024-05-06 PROCEDURE — 3079F DIAST BP 80-89 MM HG: CPT | Performed by: FAMILY MEDICINE

## 2024-05-06 PROCEDURE — G8427 DOCREV CUR MEDS BY ELIG CLIN: HCPCS | Performed by: FAMILY MEDICINE

## 2024-05-06 PROCEDURE — G8417 CALC BMI ABV UP PARAM F/U: HCPCS | Performed by: FAMILY MEDICINE

## 2024-05-06 PROCEDURE — 1036F TOBACCO NON-USER: CPT | Performed by: FAMILY MEDICINE

## 2024-05-06 NOTE — PROGRESS NOTES
Leonides Wood DO  Diplomate of the American Board of Family Medicine  [facility]      Chirag Hong III (: 1945) is a 78 y.o. male, here for evaluation of the following chief complaint(s):  Results       ASSESSMENT/PLAN:  1. HTN (hypertension), benign  -     CT CARDIAC CALCIUM SCORING; Future  2. Nephrolithiasis  3. Atherosclerosis     Discussed screening options.  Will go ahead and proceed with CT coronary calcium scoring and proceed further from there.  Tolerating medication well for HTN. Achieving desired therapeutic response with   Hypertension Medications       Calcium Channel Blockers       amLODIPine (NORVASC) 10 MG tablet Take 1 tablet by mouth daily       Angiotensin II Receptor Antagonists       irbesartan (AVAPRO) 300 MG tablet Take 1 tablet by mouth daily         --will continue. Will periodically review and adjust if needed.  Encourage home monitoring.   Will follow up with Urology as scheduled.        SUBJECTIVE/OBJECTIVE:  HPI:  Patient comes in today for follow-up regarding his recent CT results.  He actually had it due to low back pain, however there was noted to be some atherosclerosis.  He is wondering about this as far as risk factors for coronary disease.  He is asymptomatic.  No chest pain or palpitations.  Has not been checking blood pressure regularly at home.  Was also found to have some nephrolithiasis, but is currently asymptomatic as far as this is concerned.  Denies any dysuria or hematuria.  Back pain has improved with physical therapy.  ROS negative except as noted above today.    Social History     Tobacco Use    Smoking status: Former     Current packs/day: 0.00     Average packs/day: 1 pack/day for 5.0 years (5.0 ttl pk-yrs)     Types: Cigarettes     Start date: 3/24/1970     Quit date: 3/24/1975     Years since quittin.1    Smokeless tobacco: Never   Substance Use Topics    Alcohol use: Yes     Alcohol/week: 6.0 standard drinks of alcohol

## 2024-05-14 ENCOUNTER — HOSPITAL ENCOUNTER (OUTPATIENT)
Dept: CT IMAGING | Age: 79
Discharge: HOME | End: 2024-05-17
Attending: FAMILY MEDICINE

## 2024-05-14 DIAGNOSIS — I10 HTN (HYPERTENSION), BENIGN: ICD-10-CM

## 2024-05-14 PROCEDURE — 75571 CT HRT W/O DYE W/CA TEST: CPT

## 2024-05-21 DIAGNOSIS — R93.1 ELEVATED CORONARY ARTERY CALCIUM SCORE: Primary | ICD-10-CM

## 2024-06-20 ENCOUNTER — INITIAL CONSULT (OUTPATIENT)
Age: 79
End: 2024-06-20
Payer: MEDICARE

## 2024-06-20 VITALS
WEIGHT: 218 LBS | DIASTOLIC BLOOD PRESSURE: 84 MMHG | BODY MASS INDEX: 29.53 KG/M2 | HEIGHT: 72 IN | SYSTOLIC BLOOD PRESSURE: 130 MMHG | HEART RATE: 89 BPM

## 2024-06-20 DIAGNOSIS — I45.10 RBBB: ICD-10-CM

## 2024-06-20 DIAGNOSIS — E78.2 MIXED HYPERLIPIDEMIA: ICD-10-CM

## 2024-06-20 DIAGNOSIS — I25.10 CORONARY ARTERY DISEASE INVOLVING NATIVE CORONARY ARTERY OF NATIVE HEART WITHOUT ANGINA PECTORIS: ICD-10-CM

## 2024-06-20 DIAGNOSIS — R93.1 AGATSTON CAC SCORE 100-199: ICD-10-CM

## 2024-06-20 DIAGNOSIS — I10 ESSENTIAL HYPERTENSION: Primary | ICD-10-CM

## 2024-06-20 LAB
ALBUMIN SERPL-MCNC: 4 G/DL (ref 3.2–4.6)
ALBUMIN/GLOB SERPL: 1.5 (ref 1–1.9)
ALP SERPL-CCNC: 83 U/L (ref 40–129)
ALT SERPL-CCNC: 20 U/L (ref 12–65)
ANION GAP SERPL CALC-SCNC: 11 MMOL/L (ref 9–18)
AST SERPL-CCNC: 22 U/L (ref 15–37)
BILIRUB SERPL-MCNC: 0.6 MG/DL (ref 0–1.2)
BUN SERPL-MCNC: 15 MG/DL (ref 8–23)
CALCIUM SERPL-MCNC: 9 MG/DL (ref 8.8–10.2)
CHLORIDE SERPL-SCNC: 103 MMOL/L (ref 98–107)
CHOLEST SERPL-MCNC: 149 MG/DL (ref 0–200)
CO2 SERPL-SCNC: 28 MMOL/L (ref 20–28)
CREAT SERPL-MCNC: 0.85 MG/DL (ref 0.8–1.3)
GLOBULIN SER CALC-MCNC: 2.6 G/DL (ref 2.3–3.5)
GLUCOSE SERPL-MCNC: 100 MG/DL (ref 70–99)
HDLC SERPL-MCNC: 46 MG/DL (ref 40–60)
HDLC SERPL: 3.3 (ref 0–5)
LDLC SERPL CALC-MCNC: 84 MG/DL (ref 0–100)
POTASSIUM SERPL-SCNC: 4.3 MMOL/L (ref 3.5–5.1)
PROT SERPL-MCNC: 6.5 G/DL (ref 6.3–8.2)
SODIUM SERPL-SCNC: 141 MMOL/L (ref 136–145)
TRIGL SERPL-MCNC: 96 MG/DL (ref 0–150)
VLDLC SERPL CALC-MCNC: 19 MG/DL (ref 6–23)

## 2024-06-20 PROCEDURE — G8417 CALC BMI ABV UP PARAM F/U: HCPCS | Performed by: INTERNAL MEDICINE

## 2024-06-20 PROCEDURE — 93000 ELECTROCARDIOGRAM COMPLETE: CPT | Performed by: INTERNAL MEDICINE

## 2024-06-20 PROCEDURE — 3079F DIAST BP 80-89 MM HG: CPT | Performed by: INTERNAL MEDICINE

## 2024-06-20 PROCEDURE — 1036F TOBACCO NON-USER: CPT | Performed by: INTERNAL MEDICINE

## 2024-06-20 PROCEDURE — 1123F ACP DISCUSS/DSCN MKR DOCD: CPT | Performed by: INTERNAL MEDICINE

## 2024-06-20 PROCEDURE — G8428 CUR MEDS NOT DOCUMENT: HCPCS | Performed by: INTERNAL MEDICINE

## 2024-06-20 PROCEDURE — 3075F SYST BP GE 130 - 139MM HG: CPT | Performed by: INTERNAL MEDICINE

## 2024-06-20 PROCEDURE — 99204 OFFICE O/P NEW MOD 45 MIN: CPT | Performed by: INTERNAL MEDICINE

## 2024-06-20 RX ORDER — CALCIUM CARBONATE 300MG(750)
TABLET,CHEWABLE ORAL NIGHTLY
COMMUNITY

## 2024-06-20 RX ORDER — AZELASTINE 1 MG/ML
1 SPRAY, METERED NASAL 2 TIMES DAILY
COMMUNITY

## 2024-06-20 RX ORDER — CLINDAMYCIN HYDROCHLORIDE 300 MG/1
300 CAPSULE ORAL 2 TIMES DAILY
COMMUNITY

## 2024-06-20 RX ORDER — ROSUVASTATIN CALCIUM 5 MG/1
5 TABLET, COATED ORAL DAILY
Qty: 30 TABLET | Refills: 5 | Status: SHIPPED | OUTPATIENT
Start: 2024-06-20

## 2024-06-20 ASSESSMENT — ENCOUNTER SYMPTOMS
ALLERGIC/IMMUNOLOGIC NEGATIVE: 1
ORTHOPNEA: 0
SHORTNESS OF BREATH: 0
NAUSEA: 0
GASTROINTESTINAL NEGATIVE: 1
VOMITING: 0
ABDOMINAL PAIN: 0
COUGH: 0
RESPIRATORY NEGATIVE: 1

## 2024-06-20 NOTE — PROGRESS NOTES
[facility], [address], [address]     [place] [id number]      Patient:  Chirag Hong III  1945       SUBJECTIVE:  Chirag Hong III is a  78 y.o. male seen for a visit regarding the following:     Chief Complaint   Patient presents with    Consultation      Elevated coronary artery calcium score       CC: Elevated coronary artery calcium score    HPI:   78 y.o. male  with a history of moderate coronary artery calcification (), HLD, HTN, who is here for new consult.    Patient reports that  he has been following regularly with his primary care physician Dr. Wood.  He recently underwent coronary artery calcium score which demonstrated CAC of 108.  Patient reports he has been asymptomatic.  Takes his medicines as directed without missing doses.  Exercises regularly for approximately 45 minutes at least 3 times a week with cardio, weights, stretching. No exertional chest pain or chest pressure, dyspnea on exertion, lightheadedness, dizziness, syncopal events, leg swelling.  No prior history of cardiovascular medical problems aside from hypertension.  Has had diet-controlled HLD.  No history of cardiovascular medical problems in his primary relatives that he is aware of. No other complaints at this time.     Cardiovascular Testing:  -Coronary artery calcium score 5/14/24: CAC = 108.    Past medical history, past surgical history, family history, social history, and medications were all reviewed with the patient today and updated as necessary.         Patient Active Problem List    Diagnosis Date Noted    Calculus of gallbladder without cholecystitis without obstruction 11/15/2021     Priority: Low    Benign prostatic hyperplasia without lower urinary tract symptoms 06/25/2021     Priority: Low    History of COVID-19 06/25/2021     Priority: Low    Overweight (BMI 25.0-29.9) 06/19/2018     Priority: Low    Allergic rhinitis 08/22/2017     Priority: Low    History of

## 2024-06-20 NOTE — RESULT ENCOUNTER NOTE
Please call the patient regarding their result.    Electrolytes, kidney and liver function numbers look good.  LDL cholesterol is 84 which is still a little higher than we would like (ideally less than 70) continue medications as discussed at his appointment.

## 2024-06-21 ENCOUNTER — TELEPHONE (OUTPATIENT)
Age: 79
End: 2024-06-21

## 2024-06-21 NOTE — TELEPHONE ENCOUNTER
----- Message from Bryce Pereira DO sent at 6/20/2024  3:08 PM EDT -----  Please call the patient regarding their result.    Electrolytes, kidney and liver function numbers look good.  LDL cholesterol is 84 which is still a little higher than we would like (ideally less than 70) continue medications as discussed at his appointment.

## 2024-08-22 ENCOUNTER — OFFICE VISIT (OUTPATIENT)
Age: 79
End: 2024-08-22

## 2024-08-22 VITALS
DIASTOLIC BLOOD PRESSURE: 76 MMHG | HEIGHT: 72 IN | BODY MASS INDEX: 29.93 KG/M2 | WEIGHT: 221 LBS | HEART RATE: 92 BPM | SYSTOLIC BLOOD PRESSURE: 126 MMHG

## 2024-08-22 DIAGNOSIS — E78.2 MIXED HYPERLIPIDEMIA: ICD-10-CM

## 2024-08-22 DIAGNOSIS — I45.10 RBBB: ICD-10-CM

## 2024-08-22 DIAGNOSIS — I10 ESSENTIAL HYPERTENSION: Primary | ICD-10-CM

## 2024-08-22 DIAGNOSIS — R93.1 AGATSTON CAC SCORE 100-199: ICD-10-CM

## 2024-08-22 DIAGNOSIS — I35.1 MILD AORTIC INSUFFICIENCY: ICD-10-CM

## 2024-08-22 DIAGNOSIS — I25.10 CORONARY ARTERY DISEASE INVOLVING NATIVE CORONARY ARTERY OF NATIVE HEART WITHOUT ANGINA PECTORIS: ICD-10-CM

## 2024-08-22 LAB
ALBUMIN SERPL-MCNC: 4 G/DL (ref 3.2–4.6)
ALBUMIN/GLOB SERPL: 1.6 (ref 1–1.9)
ALP SERPL-CCNC: 95 U/L (ref 40–129)
ALT SERPL-CCNC: 24 U/L (ref 12–65)
ANION GAP SERPL CALC-SCNC: 10 MMOL/L (ref 9–18)
AST SERPL-CCNC: 23 U/L (ref 15–37)
BILIRUB SERPL-MCNC: 0.5 MG/DL (ref 0–1.2)
BUN SERPL-MCNC: 18 MG/DL (ref 8–23)
CALCIUM SERPL-MCNC: 9.3 MG/DL (ref 8.8–10.2)
CHLORIDE SERPL-SCNC: 103 MMOL/L (ref 98–107)
CHOLEST SERPL-MCNC: 126 MG/DL (ref 0–200)
CO2 SERPL-SCNC: 28 MMOL/L (ref 20–28)
CREAT SERPL-MCNC: 0.93 MG/DL (ref 0.8–1.3)
GLOBULIN SER CALC-MCNC: 2.6 G/DL (ref 2.3–3.5)
GLUCOSE SERPL-MCNC: 104 MG/DL (ref 70–99)
HDLC SERPL-MCNC: 49 MG/DL (ref 40–60)
HDLC SERPL: 2.6 (ref 0–5)
LDLC SERPL CALC-MCNC: 60 MG/DL (ref 0–100)
POTASSIUM SERPL-SCNC: 4.5 MMOL/L (ref 3.5–5.1)
PROT SERPL-MCNC: 6.6 G/DL (ref 6.3–8.2)
SODIUM SERPL-SCNC: 140 MMOL/L (ref 136–145)
TRIGL SERPL-MCNC: 89 MG/DL (ref 0–150)
VLDLC SERPL CALC-MCNC: 18 MG/DL (ref 6–23)

## 2024-08-22 ASSESSMENT — ENCOUNTER SYMPTOMS
COUGH: 0
SHORTNESS OF BREATH: 0

## 2024-08-22 NOTE — PROGRESS NOTES
[facility], [address], [address]     [place] [phone]      Patient:  Chirag Hong III  1945         SUBJECTIVE:  Chirag Hong III is a  78 y.o. male seen for a follow up visit regarding the following:     Chief Complaint   Patient presents with    Follow-up    Hypertension     CC: Elevated coronary artery calcium score     HPI:   78 y.o. male  with a history of moderate coronary artery calcification (), HLD, HTN, who is here for follow up.     Patient was last seen in office on 6/20/24 , since then reports that He has been doing very well.  No chest pain or chest pressure, racing heart or palpitations.  Taking his medicines as directed without missing doses.  Started rosuvastatin 5 mg oral once daily and is doing well with the medicines, no obvious side effects.  He continues to exercise regularly without angina. No interval ER visits or hospitalizations.  No other complaints at this time.    Cardiovascular Testing:  - Echo 7/17/2024: LVEF 68%, mild increase in wall thickness, RV normal size and function, Valves: Mild AI.  -Coronary artery calcium score 5/14/24: CAC = 108.     Past medical history, past surgical history, family history, social history, and medications were all reviewed with the patient today and updated as necessary.         Patient Active Problem List    Diagnosis Date Noted    Calculus of gallbladder without cholecystitis without obstruction 11/15/2021     Priority: Low    Benign prostatic hyperplasia without lower urinary tract symptoms 06/25/2021     Priority: Low    History of COVID-19 06/25/2021     Priority: Low    Overweight (BMI 25.0-29.9) 06/19/2018     Priority: Low    Allergic rhinitis 08/22/2017     Priority: Low    History of melanoma      Priority: Low    Elevated PSA 12/08/2016     Priority: Low    Mild intermittent asthma without complication      Priority: Low    HTN (hypertension), benign      Priority: Low    History of

## 2024-08-23 NOTE — RESULT ENCOUNTER NOTE
Please call the patient regarding their result.    Electrolyte, kidney, liver, and cholesterol numbers look good.    Continue current medications and keep scheduled follow-up.

## 2024-10-10 ENCOUNTER — LAB (OUTPATIENT)
Dept: UROLOGY | Age: 79
End: 2024-10-10

## 2024-10-10 DIAGNOSIS — R97.20 ELEVATED PSA: ICD-10-CM

## 2024-10-10 LAB — PSA SERPL-MCNC: 4.8 NG/ML (ref 0–4)

## 2024-10-16 ASSESSMENT — ENCOUNTER SYMPTOMS: BACK PAIN: 0

## 2024-10-16 NOTE — PROGRESS NOTES
[facility]  [address] [place] [id number]  [phone]          Chirag Hong III  : 1945    Chief Complaint   Patient presents with    Follow-up     yearly          HPI     Chirag Hong III is a 79 y.o. male followed previously by Dr. Nelson secondary to elevated psa and + family history of ACP.  He has one negative prostate in the past with Dr. Haynes.   Patient's father was diagnosed with prostate cancer later in life.     CT in  revealed a 2 mm LLP renal stone that he thinks has been present for > 20 years.  Images and report were both personally reviewed.      As long as he is in good health, he would like to continue yearly psa/michelle.       PSA:  2.9,  6.4, 10/16 3.6,  3.2,  3.1,  2.9,  3.3,  3.6, 22 7.7, 10/6/22 4.6,  4.8, 10/23 5.2, 10/24 4.8          Past Medical History:   Diagnosis Date    Asthma     Bronchitis     Calculus of gallbladder without cholecystitis without obstruction 11/15/2021    Colon polyp     COPD (chronic obstructive pulmonary disease) (HCC)     Depression     Elevated PSA 2016    GERD (gastroesophageal reflux disease)     Hypertension     Kidney stone     Malignant melanoma of skin (HCC)     SARA (obstructive sleep apnea)     Personal history of colonic polyps     Prostatitis     Sinusitis, chronic      Past Surgical History:   Procedure Laterality Date    APPENDECTOMY      When a child    CATARACT REMOVAL Bilateral ,    HEENT      sinus surgery    JOINT REPLACEMENT      Both Knees    LOBECTOMY      right lung    MALIGNANT SKIN LESION EXCISION      rt shoulder, forehead     RHINOPLASTY  2009    RHINOPLASTY  2018    TOTAL KNEE ARTHROPLASTY Left     TOTAL KNEE ARTHROPLASTY      right 2006 left      Current Outpatient Medications   Medication Sig Dispense Refill    Magnesium 400 MG TABS Take by mouth nightly      clindamycin (CLEOCIN) 300 MG capsule Take 1 capsule by

## 2024-10-17 ENCOUNTER — OFFICE VISIT (OUTPATIENT)
Dept: UROLOGY | Age: 79
End: 2024-10-17
Payer: MEDICARE

## 2024-10-17 DIAGNOSIS — N13.8 BENIGN PROSTATIC HYPERPLASIA WITH URINARY OBSTRUCTION: ICD-10-CM

## 2024-10-17 DIAGNOSIS — R97.20 ELEVATED PSA: Primary | ICD-10-CM

## 2024-10-17 DIAGNOSIS — N40.1 BENIGN PROSTATIC HYPERPLASIA WITH URINARY OBSTRUCTION: ICD-10-CM

## 2024-10-17 LAB
BILIRUBIN, URINE, POC: NEGATIVE
BLOOD URINE, POC: NEGATIVE
GLUCOSE URINE, POC: NEGATIVE MG/DL
KETONES, URINE, POC: NEGATIVE MG/DL
LEUKOCYTE ESTERASE, URINE, POC: NEGATIVE
NITRITE, URINE, POC: NEGATIVE
PH, URINE, POC: 6 (ref 4.6–8)
PROTEIN,URINE, POC: NEGATIVE MG/DL
SPECIFIC GRAVITY, URINE, POC: 1.02 (ref 1–1.03)
URINALYSIS CLARITY, POC: NORMAL
URINALYSIS COLOR, POC: NORMAL
UROBILINOGEN, POC: NORMAL MG/DL

## 2024-10-17 PROCEDURE — 99213 OFFICE O/P EST LOW 20 MIN: CPT | Performed by: UROLOGY

## 2024-10-17 PROCEDURE — 1123F ACP DISCUSS/DSCN MKR DOCD: CPT | Performed by: UROLOGY

## 2024-10-17 PROCEDURE — 81003 URINALYSIS AUTO W/O SCOPE: CPT | Performed by: UROLOGY

## 2024-10-17 PROCEDURE — G8427 DOCREV CUR MEDS BY ELIG CLIN: HCPCS | Performed by: UROLOGY

## 2024-10-17 PROCEDURE — 1036F TOBACCO NON-USER: CPT | Performed by: UROLOGY

## 2024-10-17 PROCEDURE — G8484 FLU IMMUNIZE NO ADMIN: HCPCS | Performed by: UROLOGY

## 2024-10-17 PROCEDURE — G8417 CALC BMI ABV UP PARAM F/U: HCPCS | Performed by: UROLOGY

## 2024-10-20 ENCOUNTER — PATIENT MESSAGE (OUTPATIENT)
Age: 79
End: 2024-10-20

## 2024-10-20 DIAGNOSIS — E78.2 MIXED HYPERLIPIDEMIA: ICD-10-CM

## 2024-10-20 DIAGNOSIS — R93.1 AGATSTON CAC SCORE 100-199: ICD-10-CM

## 2024-10-20 DIAGNOSIS — I25.10 CORONARY ARTERY DISEASE INVOLVING NATIVE CORONARY ARTERY OF NATIVE HEART WITHOUT ANGINA PECTORIS: ICD-10-CM

## 2024-10-21 RX ORDER — ROSUVASTATIN CALCIUM 5 MG/1
5 TABLET, COATED ORAL DAILY
Qty: 90 TABLET | Refills: 3 | Status: SHIPPED | OUTPATIENT
Start: 2024-10-21

## 2024-10-21 NOTE — TELEPHONE ENCOUNTER
Requested Prescriptions     Pending Prescriptions Disp Refills    rosuvastatin (CRESTOR) 5 MG tablet 90 tablet 3     Sig: Take 1 tablet by mouth daily     Rx verified last ov 8/22/24

## 2024-11-18 SDOH — HEALTH STABILITY: PHYSICAL HEALTH: ON AVERAGE, HOW MANY DAYS PER WEEK DO YOU ENGAGE IN MODERATE TO STRENUOUS EXERCISE (LIKE A BRISK WALK)?: 5 DAYS

## 2024-11-18 SDOH — HEALTH STABILITY: PHYSICAL HEALTH: ON AVERAGE, HOW MANY MINUTES DO YOU ENGAGE IN EXERCISE AT THIS LEVEL?: 40 MIN

## 2024-11-18 ASSESSMENT — PATIENT HEALTH QUESTIONNAIRE - PHQ9
SUM OF ALL RESPONSES TO PHQ9 QUESTIONS 1 & 2: 0
SUM OF ALL RESPONSES TO PHQ9 QUESTIONS 1 & 2: 0
SUM OF ALL RESPONSES TO PHQ QUESTIONS 1-9: 0
SUM OF ALL RESPONSES TO PHQ QUESTIONS 1-9: 0
2. FEELING DOWN, DEPRESSED OR HOPELESS: NOT AT ALL
1. LITTLE INTEREST OR PLEASURE IN DOING THINGS: NOT AT ALL
SUM OF ALL RESPONSES TO PHQ QUESTIONS 1-9: 0
SUM OF ALL RESPONSES TO PHQ QUESTIONS 1-9: 0
2. FEELING DOWN, DEPRESSED OR HOPELESS: NOT AT ALL
1. LITTLE INTEREST OR PLEASURE IN DOING THINGS: NOT AT ALL

## 2024-11-18 ASSESSMENT — LIFESTYLE VARIABLES
HOW OFTEN DURING THE LAST YEAR HAVE YOU HAD A FEELING OF GUILT OR REMORSE AFTER DRINKING: NEVER
HOW OFTEN DURING THE LAST YEAR HAVE YOU FOUND THAT YOU WERE NOT ABLE TO STOP DRINKING ONCE YOU HAD STARTED: NEVER
HOW MANY STANDARD DRINKS CONTAINING ALCOHOL DO YOU HAVE ON A TYPICAL DAY: 1
HOW OFTEN DURING THE LAST YEAR HAVE YOU FOUND THAT YOU WERE NOT ABLE TO STOP DRINKING ONCE YOU HAD STARTED: NEVER
HAVE YOU OR SOMEONE ELSE BEEN INJURED AS A RESULT OF YOUR DRINKING: NO
HOW OFTEN DURING THE LAST YEAR HAVE YOU FAILED TO DO WHAT WAS NORMALLY EXPECTED FROM YOU BECAUSE OF DRINKING: NEVER
HOW OFTEN DURING THE LAST YEAR HAVE YOU BEEN UNABLE TO REMEMBER WHAT HAPPENED THE NIGHT BEFORE BECAUSE YOU HAD BEEN DRINKING: NEVER
HOW OFTEN DURING THE LAST YEAR HAVE YOU HAD A FEELING OF GUILT OR REMORSE AFTER DRINKING: NEVER
HAS A RELATIVE, FRIEND, DOCTOR, OR ANOTHER HEALTH PROFESSIONAL EXPRESSED CONCERN ABOUT YOUR DRINKING OR SUGGESTED YOU CUT DOWN: NO
HAS A RELATIVE, FRIEND, DOCTOR, OR ANOTHER HEALTH PROFESSIONAL EXPRESSED CONCERN ABOUT YOUR DRINKING OR SUGGESTED YOU CUT DOWN: NO
HAVE YOU OR SOMEONE ELSE BEEN INJURED AS A RESULT OF YOUR DRINKING: NO
HOW OFTEN DO YOU HAVE SIX OR MORE DRINKS ON ONE OCCASION: 1
HOW OFTEN DURING THE LAST YEAR HAVE YOU BEEN UNABLE TO REMEMBER WHAT HAPPENED THE NIGHT BEFORE BECAUSE YOU HAD BEEN DRINKING: NEVER
HOW OFTEN DURING THE LAST YEAR HAVE YOU NEEDED AN ALCOHOLIC DRINK FIRST THING IN THE MORNING TO GET YOURSELF GOING AFTER A NIGHT OF HEAVY DRINKING: NEVER
HOW OFTEN DO YOU HAVE A DRINK CONTAINING ALCOHOL: 4 OR MORE TIMES A WEEK
HOW OFTEN DURING THE LAST YEAR HAVE YOU NEEDED AN ALCOHOLIC DRINK FIRST THING IN THE MORNING TO GET YOURSELF GOING AFTER A NIGHT OF HEAVY DRINKING: NEVER
HOW MANY STANDARD DRINKS CONTAINING ALCOHOL DO YOU HAVE ON A TYPICAL DAY: 1 OR 2
HOW OFTEN DO YOU HAVE A DRINK CONTAINING ALCOHOL: 5
HOW OFTEN DURING THE LAST YEAR HAVE YOU FAILED TO DO WHAT WAS NORMALLY EXPECTED FROM YOU BECAUSE OF DRINKING: NEVER

## 2024-11-19 SDOH — ECONOMIC STABILITY: FOOD INSECURITY: WITHIN THE PAST 12 MONTHS, THE FOOD YOU BOUGHT JUST DIDN'T LAST AND YOU DIDN'T HAVE MONEY TO GET MORE.: NEVER TRUE

## 2024-11-19 SDOH — ECONOMIC STABILITY: FOOD INSECURITY: WITHIN THE PAST 12 MONTHS, YOU WORRIED THAT YOUR FOOD WOULD RUN OUT BEFORE YOU GOT MONEY TO BUY MORE.: NEVER TRUE

## 2024-11-19 SDOH — ECONOMIC STABILITY: INCOME INSECURITY: HOW HARD IS IT FOR YOU TO PAY FOR THE VERY BASICS LIKE FOOD, HOUSING, MEDICAL CARE, AND HEATING?: NOT HARD AT ALL

## 2024-11-22 ENCOUNTER — OFFICE VISIT (OUTPATIENT)
Dept: INTERNAL MEDICINE CLINIC | Facility: CLINIC | Age: 79
End: 2024-11-22

## 2024-11-22 VITALS
HEART RATE: 78 BPM | HEIGHT: 72 IN | OXYGEN SATURATION: 98 % | WEIGHT: 213 LBS | DIASTOLIC BLOOD PRESSURE: 60 MMHG | SYSTOLIC BLOOD PRESSURE: 120 MMHG | BODY MASS INDEX: 28.85 KG/M2

## 2024-11-22 DIAGNOSIS — Z00.00 MEDICARE ANNUAL WELLNESS VISIT, SUBSEQUENT: Primary | ICD-10-CM

## 2024-11-22 DIAGNOSIS — R97.20 ELEVATED PSA: ICD-10-CM

## 2024-11-22 DIAGNOSIS — J45.20 MILD INTERMITTENT ASTHMA WITHOUT COMPLICATION: ICD-10-CM

## 2024-11-22 DIAGNOSIS — K21.9 GASTROESOPHAGEAL REFLUX DISEASE, UNSPECIFIED WHETHER ESOPHAGITIS PRESENT: ICD-10-CM

## 2024-11-22 DIAGNOSIS — R39.12 BENIGN PROSTATIC HYPERPLASIA WITH WEAK URINARY STREAM: ICD-10-CM

## 2024-11-22 DIAGNOSIS — I10 HTN (HYPERTENSION), BENIGN: ICD-10-CM

## 2024-11-22 DIAGNOSIS — N40.1 BENIGN PROSTATIC HYPERPLASIA WITH WEAK URINARY STREAM: ICD-10-CM

## 2024-11-22 DIAGNOSIS — N40.0 BENIGN PROSTATIC HYPERPLASIA WITHOUT LOWER URINARY TRACT SYMPTOMS: ICD-10-CM

## 2024-11-22 RX ORDER — IRBESARTAN 300 MG/1
300 TABLET ORAL DAILY
Qty: 90 TABLET | Refills: 3 | Status: SHIPPED | OUTPATIENT
Start: 2024-11-22

## 2024-11-22 RX ORDER — TAMSULOSIN HYDROCHLORIDE 0.4 MG/1
0.8 CAPSULE ORAL DAILY
Qty: 180 CAPSULE | Refills: 3 | Status: SHIPPED | OUTPATIENT
Start: 2024-11-22

## 2024-11-22 RX ORDER — AMLODIPINE BESYLATE 10 MG/1
10 TABLET ORAL DAILY
Qty: 90 TABLET | Refills: 3 | Status: SHIPPED | OUTPATIENT
Start: 2024-11-22

## 2024-11-22 NOTE — PROGRESS NOTES
Medicare Annual Wellness Visit    Chirag Hong III is here for Medicare AWV    Assessment & Plan  1. Medicare wellness visit.  Blood pressure well controlled with amlodipine and irbesartan. A1c was normal last year, no immediate blood work needed.     2. Reflux well controlled.Continue omeprazole    2. Benign prostatic hyperplasia.  PSA monitored by Urology. Continue tamsulosin therapy, effectively managing symptoms without side effects.    3. Asthma.  Continue intermittent Symbicort and Fasenra therapy, effective in controlling symptoms. Keeps an emergency inhaler (Albuterol) but has not used it in years.    Medicare annual wellness visit, subsequent  HTN (hypertension), benign  -     amLODIPine (NORVASC) 10 MG tablet; Take 1 tablet by mouth daily, Disp-90 tablet, R-3Normal  -     irbesartan (AVAPRO) 300 MG tablet; Take 1 tablet by mouth daily, Disp-90 tablet, R-3Normal  Benign prostatic hyperplasia with weak urinary stream  -     tamsulosin (FLOMAX) 0.4 MG capsule; Take 2 capsules by mouth daily, Disp-180 capsule, R-3Normal  Mild intermittent asthma without complication  Benign prostatic hyperplasia without lower urinary tract symptoms  Elevated PSA  Gastroesophageal reflux disease, unspecified whether esophagitis present      Recommendations for Preventive Services Due: see orders and patient instructions/AVS.  Recommended screening schedule for the next 5-10 years is provided to the patient in written form: see Patient Instructions/AVS.     Return in 1 year (on 11/22/2025), or if symptoms worsen or fail to improve.     Subjective   History of Present Illness  The patient came in for a Medicare wellness visit. He says he's doing great overall and keeps an eye on his blood pressure at home. He hasn't had any issues with reflux and stays active by going to the [facility] four days a week. Has had cardiac workup due to elevated coronary calcium score.   HTN: Home BP Monitoring: yes. taking medications as

## 2025-03-12 ENCOUNTER — OFFICE VISIT (OUTPATIENT)
Age: 80
End: 2025-03-12
Payer: MEDICARE

## 2025-03-12 VITALS
SYSTOLIC BLOOD PRESSURE: 136 MMHG | WEIGHT: 220 LBS | HEART RATE: 88 BPM | HEIGHT: 72 IN | BODY MASS INDEX: 29.8 KG/M2 | DIASTOLIC BLOOD PRESSURE: 64 MMHG

## 2025-03-12 DIAGNOSIS — E78.2 MIXED HYPERLIPIDEMIA: Primary | ICD-10-CM

## 2025-03-12 DIAGNOSIS — R93.1 AGATSTON CAC SCORE 100-199: ICD-10-CM

## 2025-03-12 DIAGNOSIS — I10 ESSENTIAL HYPERTENSION: ICD-10-CM

## 2025-03-12 PROCEDURE — G8417 CALC BMI ABV UP PARAM F/U: HCPCS | Performed by: INTERNAL MEDICINE

## 2025-03-12 PROCEDURE — 1123F ACP DISCUSS/DSCN MKR DOCD: CPT | Performed by: INTERNAL MEDICINE

## 2025-03-12 PROCEDURE — 99213 OFFICE O/P EST LOW 20 MIN: CPT | Performed by: INTERNAL MEDICINE

## 2025-03-12 PROCEDURE — G8428 CUR MEDS NOT DOCUMENT: HCPCS | Performed by: INTERNAL MEDICINE

## 2025-03-12 PROCEDURE — 3075F SYST BP GE 130 - 139MM HG: CPT | Performed by: INTERNAL MEDICINE

## 2025-03-12 PROCEDURE — 1036F TOBACCO NON-USER: CPT | Performed by: INTERNAL MEDICINE

## 2025-03-12 PROCEDURE — 3078F DIAST BP <80 MM HG: CPT | Performed by: INTERNAL MEDICINE

## 2025-03-12 NOTE — PATIENT INSTRUCTIONS
-   As we discussed, a Mediterranean-style diet typically includes:  *plenty of fruits, vegetables, bread and other grains, potatoes, beans, nuts and seeds;  *olive oil as a primary fat source; and  *dairy products, eggs, fish and poultry in low to moderate amounts.  *Fish and poultry are more common than red meat in this diet. It also centers on minimally processed, plant-based foods. Wine may be consumed in low to moderate amounts, usually with meals. Fruit is a common dessert instead of sweets.  **Read more at: https://www.heart.org/en/healthy-living/healthy-eating/eat-smart/nutrition-basics/mediterranean-diet  We also discussed the American Heart Association recommendations for physical activity in adults, which include:  *Get at least 150 minutes per week of moderate-intensity aerobic activity or 75 minutes per week of vigorous aerobic activity, or a combination of both, preferably spread throughout the week.  *Add moderate- to high-intensity muscle-strengthening activity (such as resistance or weights) on at least 2 days per week.  *Spend less time sitting. Even light-intensity activity can offset some of the risks of being sedentary.  *Gain even more benefits by being active at least 300 minutes (5 hours) per week.  *Increase amount and intensity gradually over time  **Read more at: https://www.heart.org/en/healthy-living/fitness/fitness-basics/aha-recs-for-physical-activity-in-adults

## 2025-03-13 DIAGNOSIS — R93.1 AGATSTON CAC SCORE 100-199: ICD-10-CM

## 2025-03-13 DIAGNOSIS — E78.2 MIXED HYPERLIPIDEMIA: ICD-10-CM

## 2025-03-13 DIAGNOSIS — I10 ESSENTIAL HYPERTENSION: ICD-10-CM

## 2025-03-13 LAB
ANION GAP SERPL CALC-SCNC: 10 MMOL/L (ref 7–16)
BUN SERPL-MCNC: 21 MG/DL (ref 8–23)
CALCIUM SERPL-MCNC: 8.9 MG/DL (ref 8.8–10.2)
CHLORIDE SERPL-SCNC: 104 MMOL/L (ref 98–107)
CHOLEST SERPL-MCNC: 101 MG/DL (ref 0–200)
CO2 SERPL-SCNC: 27 MMOL/L (ref 20–29)
CREAT SERPL-MCNC: 0.82 MG/DL (ref 0.8–1.3)
GLUCOSE SERPL-MCNC: 94 MG/DL (ref 70–99)
HDLC SERPL-MCNC: 48 MG/DL (ref 40–60)
HDLC SERPL: 2.1 (ref 0–5)
LDLC SERPL CALC-MCNC: 44 MG/DL (ref 0–100)
POTASSIUM SERPL-SCNC: 4.3 MMOL/L (ref 3.5–5.1)
SODIUM SERPL-SCNC: 140 MMOL/L (ref 136–145)
TRIGL SERPL-MCNC: 46 MG/DL (ref 0–150)
VLDLC SERPL CALC-MCNC: 9 MG/DL (ref 6–23)